# Patient Record
Sex: FEMALE | Race: WHITE | NOT HISPANIC OR LATINO | Employment: PART TIME | URBAN - NONMETROPOLITAN AREA
[De-identification: names, ages, dates, MRNs, and addresses within clinical notes are randomized per-mention and may not be internally consistent; named-entity substitution may affect disease eponyms.]

---

## 2017-02-22 ENCOUNTER — OFFICE VISIT (OUTPATIENT)
Dept: GASTROENTEROLOGY | Facility: CLINIC | Age: 27
End: 2017-02-22

## 2017-02-22 VITALS
BODY MASS INDEX: 31.32 KG/M2 | WEIGHT: 170.2 LBS | SYSTOLIC BLOOD PRESSURE: 101 MMHG | HEART RATE: 81 BPM | DIASTOLIC BLOOD PRESSURE: 70 MMHG | HEIGHT: 62 IN

## 2017-02-22 DIAGNOSIS — K21.00 GASTROESOPHAGEAL REFLUX DISEASE WITH ESOPHAGITIS: Primary | ICD-10-CM

## 2017-02-22 DIAGNOSIS — R10.84 GENERALIZED ABDOMINAL PAIN: ICD-10-CM

## 2017-02-22 DIAGNOSIS — K58.0 IRRITABLE BOWEL SYNDROME WITH DIARRHEA: ICD-10-CM

## 2017-02-22 PROCEDURE — 99213 OFFICE O/P EST LOW 20 MIN: CPT | Performed by: PHYSICIAN ASSISTANT

## 2017-02-22 RX ORDER — OMEPRAZOLE 20 MG/1
20 CAPSULE, DELAYED RELEASE ORAL DAILY PRN
COMMUNITY
End: 2017-07-19

## 2017-02-22 RX ORDER — HYOSCYAMINE SULFATE EXTENDED-RELEASE 0.38 MG/1
0.38 TABLET ORAL NIGHTLY PRN
COMMUNITY
End: 2017-07-19

## 2017-07-19 ENCOUNTER — OFFICE VISIT (OUTPATIENT)
Dept: FAMILY MEDICINE CLINIC | Facility: CLINIC | Age: 27
End: 2017-07-19

## 2017-07-19 VITALS
DIASTOLIC BLOOD PRESSURE: 62 MMHG | BODY MASS INDEX: 32.63 KG/M2 | SYSTOLIC BLOOD PRESSURE: 108 MMHG | WEIGHT: 177.31 LBS | HEART RATE: 95 BPM | HEIGHT: 62 IN | OXYGEN SATURATION: 98 %

## 2017-07-19 DIAGNOSIS — G44.209 TENSION HEADACHE: ICD-10-CM

## 2017-07-19 DIAGNOSIS — K58.0 IRRITABLE BOWEL SYNDROME WITH DIARRHEA: ICD-10-CM

## 2017-07-19 DIAGNOSIS — K21.00 GASTRO-ESOPHAGEAL REFLUX DISEASE WITH ESOPHAGITIS: Primary | ICD-10-CM

## 2017-07-19 PROCEDURE — 99214 OFFICE O/P EST MOD 30 MIN: CPT | Performed by: STUDENT IN AN ORGANIZED HEALTH CARE EDUCATION/TRAINING PROGRAM

## 2017-07-19 RX ORDER — OMEPRAZOLE 20 MG/1
20 CAPSULE, DELAYED RELEASE ORAL DAILY PRN
Qty: 30 CAPSULE | Refills: 1 | Status: SHIPPED | OUTPATIENT
Start: 2017-07-19 | End: 2018-02-21

## 2017-07-19 RX ORDER — CYCLOBENZAPRINE HCL 10 MG
10 TABLET ORAL 3 TIMES DAILY PRN
Qty: 45 TABLET | Refills: 0 | Status: SHIPPED | OUTPATIENT
Start: 2017-07-19 | End: 2017-08-17

## 2017-07-19 RX ORDER — HYOSCYAMINE SULFATE EXTENDED-RELEASE 0.38 MG/1
0.38 TABLET ORAL NIGHTLY PRN
Qty: 30 TABLET | Refills: 1 | Status: SHIPPED | OUTPATIENT
Start: 2017-07-19 | End: 2017-08-17

## 2017-07-19 RX ORDER — IBUPROFEN 600 MG/1
600 TABLET ORAL EVERY 6 HOURS PRN
Qty: 30 TABLET | Refills: 1 | Status: SHIPPED | OUTPATIENT
Start: 2017-07-19 | End: 2017-08-17

## 2017-07-19 NOTE — PROGRESS NOTES
HISTORY AND PHYSICAL  NAME: Renetta Cote  : 1990  MRN: [id number]    DATE OF ADMISSION: 17    DATE & TIME SEEN: 17 3:43 PM    PCP: William Thurman MD    CODE STATUS: [unfilled]    CHIEF COMPLAINT:   headaches      HPI:  Renetta Cote is a 27 y.o. female who presents with  p/t presents to the clinic with headaches which she has had for last 2 years. Patient states the headaches occur any time in the day with no specific triggers. P/t would rate the pain from the headache a 10/10 . She states the headaches have been almost daily and would occur all day. P/ts arms, hands, fingers would go numb during some headaches. Headaches feel like a band around her head. P/cricket was taking excedrin but would only work sometimes.  P/t states the headaches cause her to not focus. P/t also feels nausea during headaches.  P/t states she does not know what may have caused this. Heraclio also would like to be restarted on her IBS medication, Hyoscyamine and acid reflux Omeprazole. Heraclio has PSH of a cholesystectomy in .         CONCURRENT MEDICAL HISTORY:  Past Medical History:   Diagnosis Date   • Anxiety    • Diarrhea    • Dysmenorrhea    • Dysphagia    • Encounter for gynecological examination without abnormal finding    • Encounter for initial prescription of contraceptive pills    • Encounter for screening for infections with predominantly sexual mode of transmission    • Endometriosis    • Excessive and frequent menstruation with regular cycle    • Generalized abdominal pain    • GERD (gastroesophageal reflux disease)    • Hypertension    • IBS (irritable bowel syndrome)    • Secondary dysmenorrhea    • Stricture of esophagus    • Vitamin D deficiency     Deficiency of vitamin D2 - cont supp, may take 5000U daily          PAST SURGICAL HISTORY:  Past Surgical History:   Procedure Laterality Date   • CHOLECYSTECTOMY      Laparoscopic cholecystectomy with operative cholangiogram. Right upper  quadrant pain. 10/12/2006       • COLONOSCOPY  10/17/2011   • ENDOSCOPY      Colitis in terminal ileum. Biopsy taken. 10/17/2011       • ENDOSCOPY      Esophageal stricture was present. Dilatation was performed. Esophagitis seen. Biopsy taken. Gastritis was found in the stomach. Biopsy taken. Normal duodenum. 01/12/2015      • ENDOSCOPY      The stomach appeared mike. A single biopsy was obtained and placed on PyloriTek strip. Normal EGD (45.13) 01/05/2007       • EYE SURGERY      Bilateral medial rectus recession. Entropion. 10/31/1994       • OTHER SURGICAL HISTORY      TUBE IMPLANT GENERAL ANESTHETIC 24078 (1)    Bilateral myringototomy with tube insertions and adenoidectomy. 04/18/2001       • UPPER GASTROINTESTINAL ENDOSCOPY  01/12/2015       FAMILY HISTORY:  Family History   Problem Relation Age of Onset   • Diabetes Maternal Aunt    • Hypertension Maternal Aunt    • Heart disease Maternal Grandmother    • Hypertension Maternal Grandmother         SOCIAL HISTORY:  Social History     Social History   • Marital status:      Spouse name: N/A   • Number of children: N/A   • Years of education: N/A     Occupational History   • Not on file.     Social History Main Topics   • Smoking status: Current Some Day Smoker     Packs/day: 0.25     Years: 1.00     Types: Cigarettes     Start date: 7/19/2016   • Smokeless tobacco: Never Used   • Alcohol use No   • Drug use: No   • Sexual activity: Not on file     Other Topics Concern   • Not on file     Social History Narrative   • No narrative on file       HOME MEDICATIONS:    Current Outpatient Prescriptions:   •  cyclobenzaprine (FLEXERIL) 10 MG tablet, Take 1 tablet by mouth 3 (Three) Times a Day As Needed for Muscle Spasms., Disp: 45 tablet, Rfl: 0  •  hyoscyamine (LEVBID) 0.375 MG 12 hr tablet, Take 1 tablet by mouth At Night As Needed for Cramping., Disp: 30 tablet, Rfl: 1  •  ibuprofen (ADVIL,MOTRIN) 600 MG tablet, Take 1 tablet by mouth Every 6 (Six) Hours As  Needed for Mild Pain ., Disp: 30 tablet, Rfl: 1  •  omeprazole (priLOSEC) 20 MG capsule, Take 1 capsule by mouth Daily As Needed (take as needed)., Disp: 30 capsule, Rfl: 1    ALLERGIES:  Review of patient's allergies indicates no known allergies.    REVIEW OF SYSTEMS  Review of Systems   Constitutional: Positive for appetite change and fatigue.        Dec. Appetite d/t headaches   HENT: Positive for hearing loss. Negative for ear pain, facial swelling and tinnitus.         Hearing loss during headaches   Eyes: Negative for photophobia and visual disturbance.   Respiratory: Negative.    Cardiovascular: Negative.    Gastrointestinal: Positive for abdominal pain, diarrhea and nausea. Negative for abdominal distention and vomiting.        All around pain 3-4 x week   Musculoskeletal: Positive for neck stiffness.        Neck pain once in a while during headaches   Neurological: Positive for dizziness, weakness, numbness and headaches.   Psychiatric/Behavioral: Positive for agitation. Negative for confusion.       PHYSICAL EXAM:  Heart Rate:  [95] 95  BP: (108)/(62) 108/62  Body mass index is 32.43 kg/(m^2).  Physical Exam   Constitutional: She is oriented to person, place, and time. She appears well-developed and well-nourished.   HENT:   Head: Normocephalic and atraumatic.   Right Ear: External ear normal.   Left Ear: External ear normal.   Eyes: Conjunctivae and EOM are normal. Pupils are equal, round, and reactive to light.   Neck: Normal range of motion.   Cardiovascular: Normal rate, regular rhythm and normal heart sounds.    Pulmonary/Chest: Effort normal and breath sounds normal.   Abdominal: Soft. Bowel sounds are normal.   Musculoskeletal: Normal range of motion.   Neurological: She is alert and oriented to person, place, and time.   Skin: Skin is warm and dry.   Psychiatric: She has a normal mood and affect. Her behavior is normal. Judgment and thought content normal.           None    ASSESSMENT AND PLAN:  This is a 27 y.o. female with:  1. Tension Headache: p/t will be started on 600 motrin and Flexeril 10mg TID. Will f/u with p/t in 1 month.   2. IBS w/ diarrhea: p/t was not taking prescribed medication due to moving issues, will be re started on hyoscyamine. P/t currently sees Dr. Page.   3. Gerd w/ esophagitis: p/t will be re started on omeprazole 20mg.        Risk Score: 3      I discussed the patients findings and my recommendations with patient.     Dr. Shipman  is the attending on record at time of admission, he is aware of the patient's status and agrees with the above history and physical.    Goal:   Health maint: was discussed with patient, and she is up to date on her pap smear. Also discussed smoking cessation as the p/t just started 1 year back. And talked about the effects of smoking on her headaches.

## 2017-07-21 NOTE — PROGRESS NOTES
I have reviewed the notes, assessments, and/or procedures performed. I concur with her/his documentation of Renetta Cote.     Yang Shipman, DO

## 2017-08-03 RX ORDER — CYCLOBENZAPRINE HCL 10 MG
TABLET ORAL
Qty: 45 TABLET | Refills: 0 | OUTPATIENT
Start: 2017-08-03

## 2017-08-17 ENCOUNTER — OFFICE VISIT (OUTPATIENT)
Dept: FAMILY MEDICINE CLINIC | Facility: CLINIC | Age: 27
End: 2017-08-17

## 2017-08-17 VITALS
DIASTOLIC BLOOD PRESSURE: 78 MMHG | WEIGHT: 180.19 LBS | HEART RATE: 67 BPM | SYSTOLIC BLOOD PRESSURE: 118 MMHG | OXYGEN SATURATION: 99 % | BODY MASS INDEX: 33.16 KG/M2 | HEIGHT: 62 IN

## 2017-08-17 DIAGNOSIS — G44.209 TENSION HEADACHE: Primary | ICD-10-CM

## 2017-08-17 DIAGNOSIS — K52.9 IBD (INFLAMMATORY BOWEL DISEASE): ICD-10-CM

## 2017-08-17 DIAGNOSIS — F41.9 ANXIETY: ICD-10-CM

## 2017-08-17 PROCEDURE — 99213 OFFICE O/P EST LOW 20 MIN: CPT | Performed by: STUDENT IN AN ORGANIZED HEALTH CARE EDUCATION/TRAINING PROGRAM

## 2017-08-17 PROCEDURE — 99406 BEHAV CHNG SMOKING 3-10 MIN: CPT | Performed by: STUDENT IN AN ORGANIZED HEALTH CARE EDUCATION/TRAINING PROGRAM

## 2017-08-17 RX ORDER — AMITRIPTYLINE HYDROCHLORIDE 25 MG/1
25 TABLET, FILM COATED ORAL NIGHTLY
Qty: 30 TABLET | Refills: 0 | Status: SHIPPED | OUTPATIENT
Start: 2017-08-17 | End: 2017-09-12 | Stop reason: SDUPTHER

## 2017-08-17 RX ORDER — NAPROXEN 375 MG/1
375 TABLET ORAL ONCE AS NEEDED
Qty: 30 TABLET | Refills: 1 | Status: SHIPPED | OUTPATIENT
Start: 2017-08-17 | End: 2017-09-12 | Stop reason: SDUPTHER

## 2017-08-17 NOTE — PROGRESS NOTES
ID: Renetta Cote    CC:   Chief Complaint   Patient presents with   • Headache   • Follow-up       Subjective:     HPI     Renetta Cote is a 27 y.o. female who presents for follow up for tension headaches. P/t was given Flexeril and motrin 600 last month. She has been taking the ibuprofen only when she gets the headaches but has not been feeling better. Flexiril she was taking only one time a day and did feel drowsy so she stopped taking them. P/t has some anxiety. She doesn't feel like going out as much as she used to. Trys to avoid contact with others. Would like to see therapist to get properly diagnosed.   Appointment with Dr. Camilo Hillman is next wed. P/t will have her Levbid med changed to something covered by insurance.     Preventative:  Over the past 2 weeks, have you felt down, depressed, or hopeless?would rather stay home and not go out to deal with people   Over the past 2 weeks, have you felt little interest or pleasure in doing things?No  Clinical depression screening refused by patient.No         Past Medical Hx:  Past Medical History:   Diagnosis Date   • Anxiety    • Diarrhea    • Dysmenorrhea    • Dysphagia    • Encounter for gynecological examination without abnormal finding    • Encounter for initial prescription of contraceptive pills    • Encounter for screening for infections with predominantly sexual mode of transmission    • Endometriosis    • Excessive and frequent menstruation with regular cycle    • Generalized abdominal pain    • GERD (gastroesophageal reflux disease)    • Hypertension    • IBS (irritable bowel syndrome)    • Secondary dysmenorrhea    • Stricture of esophagus    • Vitamin D deficiency     Deficiency of vitamin D2 - cont supp, may take 5000U daily          Past Surgical Hx:  Past Surgical History:   Procedure Laterality Date   • CHOLECYSTECTOMY      Laparoscopic cholecystectomy with operative cholangiogram. Right upper quadrant pain. 10/12/2006       •  COLONOSCOPY  10/17/2011   • ENDOSCOPY      Colitis in terminal ileum. Biopsy taken. 10/17/2011       • ENDOSCOPY      Esophageal stricture was present. Dilatation was performed. Esophagitis seen. Biopsy taken. Gastritis was found in the stomach. Biopsy taken. Normal duodenum. 01/12/2015      • ENDOSCOPY      The stomach appeared mike. A single biopsy was obtained and placed on PyloriTek strip. Normal EGD (45.13) 01/05/2007       • EYE SURGERY      Bilateral medial rectus recession. Entropion. 10/31/1994       • OTHER SURGICAL HISTORY      TUBE IMPLANT GENERAL ANESTHETIC 27041 (1)    Bilateral myringototomy with tube insertions and adenoidectomy. 04/18/2001       • UPPER GASTROINTESTINAL ENDOSCOPY  01/12/2015       Health Maintenance:  Health Maintenance   Topic Date Due   • PNEUMOCOCCAL VACCINE (19-64 MEDIUM RISK) (1 of 1 - PPSV23) 07/13/2008   • TDAP/TD VACCINES (1 - Tdap) 07/13/2008   • INFLUENZA VACCINE  09/01/2017   • PAP SMEAR  10/26/2019       Current Meds:    Current Outpatient Prescriptions:   •  amitriptyline (ELAVIL) 25 MG tablet, Take 1 tablet by mouth Every Night., Disp: 30 tablet, Rfl: 0  •  naproxen (NAPROSYN) 375 MG tablet, Take 1 tablet by mouth 1 (One) Time As Needed for Mild Pain  for up to 1 dose., Disp: 30 tablet, Rfl: 1  •  omeprazole (priLOSEC) 20 MG capsule, Take 1 capsule by mouth Daily As Needed (take as needed)., Disp: 30 capsule, Rfl: 1    Allergies:  Review of patient's allergies indicates no known allergies.    Family Hx:  Family History   Problem Relation Age of Onset   • Diabetes Maternal Aunt    • Hypertension Maternal Aunt    • Heart disease Maternal Grandmother    • Hypertension Maternal Grandmother         Social History:  Social History     Social History   • Marital status:      Spouse name: N/A   • Number of children: N/A   • Years of education: N/A     Occupational History   • Not on file.     Social History Main Topics   • Smoking status: Current Some Day Smoker      "Packs/day: 0.25     Years: 1.00     Types: Cigarettes     Start date: 7/19/2016   • Smokeless tobacco: Never Used   • Alcohol use No   • Drug use: No   • Sexual activity: Not on file     Other Topics Concern   • Not on file     Social History Narrative   • No narrative on file       Review of Systems   Constitutional: Negative for activity change, appetite change and fatigue.   HENT: Negative for ear pain, hearing loss and tinnitus.    Eyes: Negative for photophobia and visual disturbance.   Respiratory: Negative for cough, choking, chest tightness and shortness of breath.    Cardiovascular: Negative for chest pain and leg swelling.   Gastrointestinal: Positive for nausea. Negative for abdominal distention, abdominal pain, blood in stool, constipation, diarrhea, rectal pain and vomiting.   Musculoskeletal: Negative for neck pain and neck stiffness.   Neurological: Positive for headaches. Negative for dizziness, tremors, weakness, light-headedness and numbness.   Psychiatric/Behavioral: Positive for agitation. Negative for decreased concentration, sleep disturbance and suicidal ideas. The patient is nervous/anxious.            Objective:     /78  Pulse 67  Ht 62\" (157.5 cm)  Wt 180 lb 3 oz (81.7 kg)  LMP 07/01/2017  SpO2 99%  BMI 32.96 kg/m2    Physical Exam   Constitutional: She is oriented to person, place, and time. She appears well-developed and well-nourished.   HENT:   Right Ear: External ear normal.   Left Ear: External ear normal.   Eyes: Conjunctivae and EOM are normal. Pupils are equal, round, and reactive to light.   Neck: Normal range of motion. Neck supple. No thyromegaly present.   Cardiovascular: Normal rate, regular rhythm and normal heart sounds.  Exam reveals no gallop and no friction rub.    No murmur heard.  Pulmonary/Chest: Effort normal and breath sounds normal. No respiratory distress. She has no wheezes.   Neurological: She is alert and oriented to person, place, and time. She has " normal reflexes.   Psychiatric: She has a normal mood and affect. Her behavior is normal. Judgment and thought content normal.              Assessment/Plan:     Renetta was seen today for headache and follow-up.    Diagnoses and all orders for this visit:    Tension headache- Flexiril and Ibuprofen were d/c 2/2 drowsiness and lack of effect.  P/t has hx of gastritis. Switched to naproxen 375mg and amitryp. 25mg once at night. Will f/u in 1 month.     Anxiety  -     Ambulatory Referral to Psychology    Other orders  -     amitriptyline (ELAVIL) 25 MG tablet; Take 1 tablet by mouth Every Night.  -     naproxen (NAPROSYN) 375 MG tablet; Take 1 tablet by mouth 1 (One) Time As Needed for Mild Pain  for up to 1 dose.  IBD  - appointment with dr. Page next wed. For f/u visit.   - no current sx    Follow-up:     Return in about 1 month (around 9/17/2017), or if symptoms worsen or fail to improve.          Health Maintenance   Topic Date Due   • PNEUMOCOCCAL VACCINE (19-64 MEDIUM RISK) (1 of 1 - PPSV23) 07/13/2008   • TDAP/TD VACCINES (1 - Tdap) 07/13/2008   • INFLUENZA VACCINE  09/01/2017   • PAP SMEAR  10/26/2019       Smoking cessation counseling was provided.  occasional/rare  eat more fruits and vegetables, increase water intake and increase physical activity    RISK SCORE: 4          This document has been electronically signed by William Thurman MD on August 17, 2017 9:44 AM

## 2017-09-12 ENCOUNTER — OFFICE VISIT (OUTPATIENT)
Dept: FAMILY MEDICINE CLINIC | Facility: CLINIC | Age: 27
End: 2017-09-12

## 2017-09-12 VITALS
WEIGHT: 184.5 LBS | BODY MASS INDEX: 33.95 KG/M2 | HEIGHT: 62 IN | DIASTOLIC BLOOD PRESSURE: 78 MMHG | OXYGEN SATURATION: 99 % | HEART RATE: 79 BPM | SYSTOLIC BLOOD PRESSURE: 112 MMHG

## 2017-09-12 DIAGNOSIS — G44.229 CHRONIC TENSION-TYPE HEADACHE, NOT INTRACTABLE: Primary | ICD-10-CM

## 2017-09-12 PROCEDURE — 99213 OFFICE O/P EST LOW 20 MIN: CPT | Performed by: STUDENT IN AN ORGANIZED HEALTH CARE EDUCATION/TRAINING PROGRAM

## 2017-09-12 RX ORDER — NAPROXEN 375 MG/1
375 TABLET ORAL ONCE AS NEEDED
Qty: 30 TABLET | Refills: 1 | Status: SHIPPED | OUTPATIENT
Start: 2017-09-12 | End: 2017-11-24 | Stop reason: SDUPTHER

## 2017-09-12 RX ORDER — AMITRIPTYLINE HYDROCHLORIDE 25 MG/1
25 TABLET, FILM COATED ORAL NIGHTLY
Qty: 30 TABLET | Refills: 2 | Status: SHIPPED | OUTPATIENT
Start: 2017-09-12 | End: 2017-12-09 | Stop reason: SDUPTHER

## 2017-09-12 NOTE — PROGRESS NOTES
ID: Renetta Cote    CC:   Chief Complaint   Patient presents with   • Headache     f/u       Subjective:     HPI     Renetta Cote is a 27 y.o. female who presents for follow up for tension headaches. Medication was changed on last visit from motrin and flexeril  to elavil 25 and naprosyn 375 for her daily headaches. Patient has been doing a lot better. States she has had only 3 headaches since her last visit. Before treatment she would have headaches occurring daily. Denies any n/v or fever. Patient states she has completely stopped smoking about 2-3 weeks ago.   Patient states she is struggling losing weight. She does not exercise and does not eat more than 1 meal a day. She states she does drink a lot of soda. I informed patient her first step would be to cut the soda from her diet.   Would like refills on medications today.     Preventative:  Over the past 2 weeks, have you felt down, depressed, or hopeless?No   Over the past 2 weeks, have you felt little interest or pleasure in doing things?No  Clinical depression screening refused by patient.No     On osteoporosis therapy?No     Past Medical Hx:  Past Medical History:   Diagnosis Date   • Anxiety    • Diarrhea    • Dysmenorrhea    • Dysphagia    • Encounter for gynecological examination without abnormal finding    • Encounter for initial prescription of contraceptive pills    • Encounter for screening for infections with predominantly sexual mode of transmission    • Endometriosis    • Excessive and frequent menstruation with regular cycle    • Generalized abdominal pain    • GERD (gastroesophageal reflux disease)    • Hypertension    • IBS (irritable bowel syndrome)    • Secondary dysmenorrhea    • Stricture of esophagus    • Vitamin D deficiency     Deficiency of vitamin D2 - cont supp, may take 5000U daily          Past Surgical Hx:  Past Surgical History:   Procedure Laterality Date   • CHOLECYSTECTOMY      Laparoscopic cholecystectomy  with operative cholangiogram. Right upper quadrant pain. 10/12/2006       • COLONOSCOPY  10/17/2011   • ENDOSCOPY      Colitis in terminal ileum. Biopsy taken. 10/17/2011       • ENDOSCOPY      Esophageal stricture was present. Dilatation was performed. Esophagitis seen. Biopsy taken. Gastritis was found in the stomach. Biopsy taken. Normal duodenum. 01/12/2015      • ENDOSCOPY      The stomach appeared mike. A single biopsy was obtained and placed on PyloriTek strip. Normal EGD (45.13) 01/05/2007       • EYE SURGERY      Bilateral medial rectus recession. Entropion. 10/31/1994       • OTHER SURGICAL HISTORY      TUBE IMPLANT GENERAL ANESTHETIC 93901 (1)    Bilateral myringototomy with tube insertions and adenoidectomy. 04/18/2001       • UPPER GASTROINTESTINAL ENDOSCOPY  01/12/2015       Health Maintenance:  Health Maintenance   Topic Date Due   • PNEUMOCOCCAL VACCINE (19-64 MEDIUM RISK) (1 of 1 - PPSV23) 07/13/2008   • TDAP/TD VACCINES (1 - Tdap) 07/13/2008   • INFLUENZA VACCINE  08/01/2017   • PAP SMEAR  10/26/2019       Current Meds:    Current Outpatient Prescriptions:   •  amitriptyline (ELAVIL) 25 MG tablet, Take 1 tablet by mouth Every Night., Disp: 30 tablet, Rfl: 2  •  naproxen (NAPROSYN) 375 MG tablet, Take 1 tablet by mouth 1 (One) Time As Needed for Mild Pain  for up to 1 dose., Disp: 30 tablet, Rfl: 1  •  omeprazole (priLOSEC) 20 MG capsule, Take 1 capsule by mouth Daily As Needed (take as needed)., Disp: 30 capsule, Rfl: 1    Allergies:  Review of patient's allergies indicates no known allergies.    Family Hx:  Family History   Problem Relation Age of Onset   • Diabetes Maternal Aunt    • Hypertension Maternal Aunt    • Heart disease Maternal Grandmother    • Hypertension Maternal Grandmother         Social History:  Social History     Social History   • Marital status:      Spouse name: N/A   • Number of children: N/A   • Years of education: N/A     Occupational History   • Not on file.  "    Social History Main Topics   • Smoking status: Current Some Day Smoker     Packs/day: 0.25     Years: 1.00     Types: Cigarettes     Start date: 7/19/2016   • Smokeless tobacco: Never Used   • Alcohol use No   • Drug use: No   • Sexual activity: Not on file     Other Topics Concern   • Not on file     Social History Narrative   • No narrative on file       Review of Systems   Constitutional: Negative for activity change, fatigue, fever and unexpected weight change.   Eyes: Negative.    Respiratory: Negative for cough, shortness of breath and wheezing.    Cardiovascular: Negative for chest pain.   Gastrointestinal: Negative for abdominal distention, constipation, diarrhea and nausea.   Endocrine: Negative.    Genitourinary: Negative.    Musculoskeletal: Negative.    Skin: Negative.    Allergic/Immunologic: Negative.    Neurological: Positive for headaches. Negative for weakness.   Hematological: Negative.    Psychiatric/Behavioral: Negative for agitation, behavioral problems, confusion, decreased concentration, dysphoric mood and sleep disturbance. The patient is not nervous/anxious.            Objective:     /78  Pulse 79  Ht 62\" (157.5 cm)  Wt 184 lb 8 oz (83.7 kg)  SpO2 99%  BMI 33.75 kg/m2    Physical Exam   Constitutional: She is oriented to person, place, and time. She appears well-developed and well-nourished.   HENT:   Head: Normocephalic.   Neck: Normal range of motion. No thyromegaly present.   Cardiovascular: Normal rate, regular rhythm and normal heart sounds.  Exam reveals no gallop and no friction rub.    No murmur heard.  Pulmonary/Chest: Effort normal and breath sounds normal. She has no wheezes.   Abdominal: Soft. Bowel sounds are normal.   Neurological: She is alert and oriented to person, place, and time.   Psychiatric: She has a normal mood and affect. Her behavior is normal. Judgment and thought content normal.              Assessment/Plan:     Renetta was seen today for " headache.    Diagnoses and all orders for this visit:    Chronic tension-type headache, not intractable  Patient doing a lot better since being started on medication. Will refill today.   -     amitriptyline (ELAVIL) 25 MG tablet; Take 1 tablet by mouth Every Night.  -     naproxen (NAPROSYN) 375 MG tablet; Take 1 tablet by mouth 1 (One) Time As Needed for Mild Pain  for up to 1 dose.    BMI 33.0-33.9,adult  - patient had some concerns regarding her struggle with losing weight.  - informed patient first step to take right now would be to cut the pop out of her diet.           Follow-up:     Return in about 3 months (around 12/12/2017) for Next scheduled follow up.          Health Maintenance   Topic Date Due   • PNEUMOCOCCAL VACCINE (19-64 MEDIUM RISK) (1 of 1 - PPSV23) 07/13/2008   • TDAP/TD VACCINES (1 - Tdap) 07/13/2008   • INFLUENZA VACCINE  08/01/2017   • PAP SMEAR  10/26/2019       patient states she has quit smoking 2-3 weeks ago.   does not drink  decrease soda or juice intake, increase water intake and increase physical activity    RISK SCORE: 4  This document has been electronically signed by William Thurman MD on September 12, 2017 9:29 AM

## 2017-09-13 NOTE — PROGRESS NOTES
I have seen this patient and discussed the case with resident and agree with the assessment and plan.  RAMYA Cotton M.D.

## 2017-10-16 ENCOUNTER — OFFICE VISIT (OUTPATIENT)
Dept: GASTROENTEROLOGY | Facility: CLINIC | Age: 27
End: 2017-10-16

## 2017-10-16 ENCOUNTER — APPOINTMENT (OUTPATIENT)
Dept: LAB | Facility: HOSPITAL | Age: 27
End: 2017-10-16

## 2017-10-16 VITALS
BODY MASS INDEX: 34.6 KG/M2 | HEART RATE: 82 BPM | SYSTOLIC BLOOD PRESSURE: 122 MMHG | WEIGHT: 188 LBS | DIASTOLIC BLOOD PRESSURE: 82 MMHG | HEIGHT: 62 IN

## 2017-10-16 DIAGNOSIS — K21.00 GASTROESOPHAGEAL REFLUX DISEASE WITH ESOPHAGITIS: Primary | ICD-10-CM

## 2017-10-16 DIAGNOSIS — R10.84 GENERALIZED ABDOMINAL PAIN: ICD-10-CM

## 2017-10-16 DIAGNOSIS — R53.81 MALAISE AND FATIGUE: ICD-10-CM

## 2017-10-16 DIAGNOSIS — R53.83 MALAISE AND FATIGUE: ICD-10-CM

## 2017-10-16 DIAGNOSIS — K58.0 IRRITABLE BOWEL SYNDROME WITH DIARRHEA: ICD-10-CM

## 2017-10-16 DIAGNOSIS — R63.5 WEIGHT GAIN, ABNORMAL: ICD-10-CM

## 2017-10-16 LAB
25(OH)D3 SERPL-MCNC: 25 NG/ML (ref 30–100)
ALBUMIN SERPL-MCNC: 4.5 G/DL (ref 3.4–4.8)
ALBUMIN/GLOB SERPL: 1.3 G/DL (ref 1.1–1.8)
ALP SERPL-CCNC: 72 U/L (ref 38–126)
ALT SERPL W P-5'-P-CCNC: 33 U/L (ref 9–52)
ANION GAP SERPL CALCULATED.3IONS-SCNC: 11 MMOL/L (ref 5–15)
AST SERPL-CCNC: 29 U/L (ref 14–36)
B-HCG UR QL: NEGATIVE
BASOPHILS # BLD AUTO: 0.03 10*3/MM3 (ref 0–0.2)
BASOPHILS NFR BLD AUTO: 0.3 % (ref 0–2)
BILIRUB SERPL-MCNC: 0.6 MG/DL (ref 0.2–1.3)
BUN BLD-MCNC: 11 MG/DL (ref 7–21)
BUN/CREAT SERPL: 16.2 (ref 7–25)
CALCIUM SPEC-SCNC: 8.9 MG/DL (ref 8.4–10.2)
CHLORIDE SERPL-SCNC: 104 MMOL/L (ref 95–110)
CO2 SERPL-SCNC: 28 MMOL/L (ref 22–31)
CREAT BLD-MCNC: 0.68 MG/DL (ref 0.5–1)
DEPRECATED RDW RBC AUTO: 41 FL (ref 36.4–46.3)
EOSINOPHIL # BLD AUTO: 0.44 10*3/MM3 (ref 0–0.7)
EOSINOPHIL NFR BLD AUTO: 4.9 % (ref 0–7)
ERYTHROCYTE [DISTWIDTH] IN BLOOD BY AUTOMATED COUNT: 12.9 % (ref 11.5–14.5)
GFR SERPL CREATININE-BSD FRML MDRD: 103 ML/MIN/1.73 (ref 71–165)
GLOBULIN UR ELPH-MCNC: 3.5 GM/DL (ref 2.3–3.5)
GLUCOSE BLD-MCNC: 93 MG/DL (ref 60–100)
HCT VFR BLD AUTO: 36.5 % (ref 35–45)
HGB BLD-MCNC: 12.5 G/DL (ref 12–15.5)
IMM GRANULOCYTES # BLD: 0.02 10*3/MM3 (ref 0–0.02)
IMM GRANULOCYTES NFR BLD: 0.2 % (ref 0–0.5)
LYMPHOCYTES # BLD AUTO: 1.81 10*3/MM3 (ref 0.6–4.2)
LYMPHOCYTES NFR BLD AUTO: 20 % (ref 10–50)
MCH RBC QN AUTO: 29.7 PG (ref 26.5–34)
MCHC RBC AUTO-ENTMCNC: 34.2 G/DL (ref 31.4–36)
MCV RBC AUTO: 86.7 FL (ref 80–98)
MONOCYTES # BLD AUTO: 0.53 10*3/MM3 (ref 0–0.9)
MONOCYTES NFR BLD AUTO: 5.9 % (ref 0–12)
NEUTROPHILS # BLD AUTO: 6.2 10*3/MM3 (ref 2–8.6)
NEUTROPHILS NFR BLD AUTO: 68.7 % (ref 37–80)
PLATELET # BLD AUTO: 256 10*3/MM3 (ref 150–450)
PMV BLD AUTO: 10.1 FL (ref 8–12)
POTASSIUM BLD-SCNC: 3.7 MMOL/L (ref 3.5–5.1)
PROT SERPL-MCNC: 8 G/DL (ref 6.3–8.6)
RBC # BLD AUTO: 4.21 10*6/MM3 (ref 3.77–5.16)
SODIUM BLD-SCNC: 143 MMOL/L (ref 137–145)
T4 FREE SERPL-MCNC: 0.8 NG/DL (ref 0.78–2.19)
TSH SERPL DL<=0.05 MIU/L-ACNC: 2.63 MIU/ML (ref 0.46–4.68)
VIT B12 BLD-MCNC: 262 PG/ML (ref 239–931)
WBC NRBC COR # BLD: 9.03 10*3/MM3 (ref 3.2–9.8)

## 2017-10-16 PROCEDURE — 82607 VITAMIN B-12: CPT | Performed by: PHYSICIAN ASSISTANT

## 2017-10-16 PROCEDURE — 81025 URINE PREGNANCY TEST: CPT | Performed by: PHYSICIAN ASSISTANT

## 2017-10-16 PROCEDURE — 99213 OFFICE O/P EST LOW 20 MIN: CPT | Performed by: PHYSICIAN ASSISTANT

## 2017-10-16 PROCEDURE — 80050 GENERAL HEALTH PANEL: CPT | Performed by: PHYSICIAN ASSISTANT

## 2017-10-16 PROCEDURE — 84703 CHORIONIC GONADOTROPIN ASSAY: CPT | Performed by: PHYSICIAN ASSISTANT

## 2017-10-16 PROCEDURE — 82306 VITAMIN D 25 HYDROXY: CPT | Performed by: PHYSICIAN ASSISTANT

## 2017-10-16 PROCEDURE — 84439 ASSAY OF FREE THYROXINE: CPT | Performed by: PHYSICIAN ASSISTANT

## 2017-10-16 PROCEDURE — 36415 COLL VENOUS BLD VENIPUNCTURE: CPT | Performed by: PHYSICIAN ASSISTANT

## 2017-10-16 PROCEDURE — 84481 FREE ASSAY (FT-3): CPT | Performed by: PHYSICIAN ASSISTANT

## 2017-10-17 DIAGNOSIS — R10.84 ABDOMINAL PAIN, GENERALIZED: ICD-10-CM

## 2017-10-17 DIAGNOSIS — R63.5 WEIGHT GAIN: Primary | ICD-10-CM

## 2017-10-17 LAB
HCG SERPL QL: NEGATIVE
T3FREE SERPL-MCNC: 3.5 PG/ML (ref 2–4.4)

## 2017-10-18 DIAGNOSIS — E55.9 VITAMIN D DEFICIENCY: Primary | ICD-10-CM

## 2017-10-18 RX ORDER — ERGOCALCIFEROL 1.25 MG/1
50000 CAPSULE ORAL
Qty: 4 CAPSULE | Refills: 2 | Status: SHIPPED | OUTPATIENT
Start: 2017-10-18 | End: 2018-02-21

## 2017-11-13 RX ORDER — NAPROXEN 375 MG/1
TABLET ORAL
Qty: 30 TABLET | Refills: 1 | OUTPATIENT
Start: 2017-11-13

## 2017-11-27 ENCOUNTER — OFFICE VISIT (OUTPATIENT)
Dept: BEHAVIORAL HEALTH | Facility: CLINIC | Age: 27
End: 2017-11-27

## 2017-11-27 DIAGNOSIS — F41.1 GENERALIZED ANXIETY DISORDER: Primary | ICD-10-CM

## 2017-11-27 PROCEDURE — 90791 PSYCH DIAGNOSTIC EVALUATION: CPT | Performed by: PSYCHOLOGIST

## 2017-11-27 RX ORDER — NAPROXEN 375 MG/1
TABLET ORAL
Qty: 30 TABLET | Refills: 1 | Status: SHIPPED | OUTPATIENT
Start: 2017-11-27 | End: 2018-02-21

## 2017-11-27 NOTE — PROGRESS NOTES
11/27/2017    Renetta Cote, a 27 y.o. female, was seen today for initial appointment lasting 45 minutes.  Patient is referred by William Thurman MD .     SUBJECTIVE:  Patient reported that she suffers from anxiety.  She has panic attacks where her heart races and she has a scared feeling.  They occur once every couple of weeks.  Current treatment is Elavil prescribed by her physician.  The main cause of her anxiety, and the main stressor in her life is her mother.  Her mother is always interfering in her life and is always critical of everything the patient does.  This patient lives with her fiancé of 3 years.  She has 1 daughter age 7, and a stepson age 10.  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  Her current patient is her nephew.    FAMILY HISTORY:  Negative for psychiatric conditions    This patient was born and raised here in [facility].  Her parents were  when she was age 1 and her mother remarried her stepfather.  Patient did not suffer any mistreatment or abuse.  She did well in school and graduated high school    MENTAL STATUS:  Patient presents as an attractive young lady who looks her stated age.  She is oriented ×3, thoughts are goal-directed and logical, memory and concentration were well within normal limits, emotions were appropriate.  She has no problems sleeping.  Her energy during the day is fairly good he though she is tired and feels dizzy.  She occasionally has feelings of depression being overwhelmed.  She has anxiety most of the time and occasional panic attacks.  Sometimes she is graham.  She denies suicidal ideation, homicidal ideation, and hallucinations.    DIAGNOSIS:    ICD-10-CM ICD-9-CM   1. Generalized anxiety disorder F41.1 300.02       ASSESSMENT PLAN:  The main issue that seems to be creating her anxiety is dealing with her mother.  Today we talked about the need for Renetta to disengage from arguments with her  mother.  Not allow her mother to get her upset.          This document has been electronically signed by Joshua Davis EdD on November 27, 2017 11:10 AM

## 2017-12-11 RX ORDER — AMITRIPTYLINE HYDROCHLORIDE 25 MG/1
TABLET, FILM COATED ORAL
Qty: 30 TABLET | Refills: 2 | Status: SHIPPED | OUTPATIENT
Start: 2017-12-11 | End: 2018-02-21

## 2018-01-25 ENCOUNTER — OFFICE VISIT (OUTPATIENT)
Dept: BEHAVIORAL HEALTH | Facility: CLINIC | Age: 28
End: 2018-01-25

## 2018-01-25 DIAGNOSIS — F41.1 GENERALIZED ANXIETY DISORDER: Primary | ICD-10-CM

## 2018-01-25 PROCEDURE — 90834 PSYTX W PT 45 MINUTES: CPT | Performed by: PSYCHOLOGIST

## 2018-01-25 NOTE — PROGRESS NOTES
This patient was seen today for 45 minute therapy appointment    Again we talk about her anxiety.  She stated that 80% of her anxiety is caused by her mother.  Her mother was always trying to manipulate, and interfere in patient's life.  This patient is living with her boyfriend, her 7-year-old daughter and his 10-year-old son.  Mother has lots of advice for them.    We talked about the patient's plan for dealing with her mother.  She simply doesn't respond to mother's more provocative statements.  She doesn't allow mother to bully her.  The patient and her  agree on how to deal with mother, and they don't let mother divide them up or create drama.  However, mother's constant meddling does take a toll on the patient    Here today this patient is oriented ×3, thoughts goal-directed and logical, memory and concentration are well within normal limits.  Affect is appropriate    Next appointment will be in one month

## 2018-02-16 ENCOUNTER — TELEPHONE (OUTPATIENT)
Dept: FAMILY MEDICINE CLINIC | Facility: CLINIC | Age: 28
End: 2018-02-16

## 2018-03-05 ENCOUNTER — OFFICE VISIT (OUTPATIENT)
Dept: FAMILY MEDICINE CLINIC | Facility: CLINIC | Age: 28
End: 2018-03-05

## 2018-03-05 VITALS
HEART RATE: 75 BPM | OXYGEN SATURATION: 98 % | HEIGHT: 62 IN | DIASTOLIC BLOOD PRESSURE: 69 MMHG | BODY MASS INDEX: 32.54 KG/M2 | SYSTOLIC BLOOD PRESSURE: 110 MMHG | WEIGHT: 176.8 LBS

## 2018-03-05 DIAGNOSIS — M54.50 ACUTE MIDLINE LOW BACK PAIN WITHOUT SCIATICA: Primary | ICD-10-CM

## 2018-03-05 PROCEDURE — 99213 OFFICE O/P EST LOW 20 MIN: CPT | Performed by: STUDENT IN AN ORGANIZED HEALTH CARE EDUCATION/TRAINING PROGRAM

## 2018-03-05 NOTE — PROGRESS NOTES
ID: Renetta Cote    CC:   Chief Complaint   Patient presents with   • Back Pain     Right low back        Subjective:     HPI     Renetta Cote is a 27 y.o. female who presents for an urgent care follow up after falling on her back, after  slipping on ice in January. Went to  on 2/21 becuase of continued pain in lower back. An Xray completed on the back showed degen changes, most likely not due to acute injury. Patient reports feeling much better with improvement in pain, and some slight tightness. Patient uses NSAIDS occasionally along with heat/ice pack.   Reports she has been working on her diet and will continue to not drink pop. Her next step for weight reduction will be to exercise.    Patient denies any flank pain, hematuria, dysuria, polyuria, polydipsia.   Preventative:  Over the past 2 weeks, have you felt down, depressed, or hopeless?Yes   Over the past 2 weeks, have you felt little interest or pleasure in doing things?Yes  Clinical depression screening refused by patient.Yes     On osteoporosis therapy?No     Past Medical Hx:  Past Medical History:   Diagnosis Date   • Anxiety    • Diarrhea    • Dysmenorrhea    • Dysphagia    • Encounter for gynecological examination without abnormal finding    • Encounter for initial prescription of contraceptive pills    • Encounter for screening for infections with predominantly sexual mode of transmission    • Endometriosis    • Excessive and frequent menstruation with regular cycle    • Generalized abdominal pain    • GERD (gastroesophageal reflux disease)    • Hypertension    • IBS (irritable bowel syndrome)    • Secondary dysmenorrhea    • Stricture of esophagus    • Vitamin D deficiency     Deficiency of vitamin D2 - cont supp, may take 5000U daily          Past Surgical Hx:  Past Surgical History:   Procedure Laterality Date   • CHOLECYSTECTOMY      Laparoscopic cholecystectomy with operative cholangiogram. Right upper quadrant pain.  10/12/2006       • COLONOSCOPY  10/17/2011   • ENDOSCOPY      Colitis in terminal ileum. Biopsy taken. 10/17/2011       • ENDOSCOPY      Esophageal stricture was present. Dilatation was performed. Esophagitis seen. Biopsy taken. Gastritis was found in the stomach. Biopsy taken. Normal duodenum. 01/12/2015      • ENDOSCOPY      The stomach appeared mike. A single biopsy was obtained and placed on PyloriTek strip. Normal EGD (45.13) 01/05/2007       • EYE SURGERY      Bilateral medial rectus recession. Entropion. 10/31/1994       • OTHER SURGICAL HISTORY      TUBE IMPLANT GENERAL ANESTHETIC 79364 (1)    Bilateral myringototomy with tube insertions and adenoidectomy. 04/18/2001       • UPPER GASTROINTESTINAL ENDOSCOPY  01/12/2015       Health Maintenance:  Health Maintenance   Topic Date Due   • TDAP/TD VACCINES (1 - Tdap) 07/13/2008   • INFLUENZA VACCINE  08/01/2017   • PAP SMEAR  10/26/2019       Current Meds:    Current Outpatient Prescriptions:   •  cyclobenzaprine (FLEXERIL) 5 MG tablet, Take one pill qhs prn muscle relaxation, Disp: 21 tablet, Rfl: 0  •  diclofenac (VOLTAREN) 75 MG EC tablet, Take 1 tablet by mouth 2 (Two) Times a Day., Disp: 60 tablet, Rfl: 0    Allergies:  Review of patient's allergies indicates no known allergies.    Family Hx:  Family History   Problem Relation Age of Onset   • Diabetes Maternal Aunt    • Hypertension Maternal Aunt    • Heart disease Maternal Grandmother    • Hypertension Maternal Grandmother         Social History:  Social History     Social History   • Marital status:      Spouse name: N/A   • Number of children: N/A   • Years of education: N/A     Occupational History   • Not on file.     Social History Main Topics   • Smoking status: Former Smoker     Packs/day: 0.25     Years: 1.00     Types: Cigarettes     Start date: 7/19/2016     Quit date: 8/16/2017   • Smokeless tobacco: Never Used   • Alcohol use No   • Drug use: No   • Sexual activity: Not on file  "    Other Topics Concern   • Not on file     Social History Narrative       Review of Systems  General:negative for - chills, fatigue, fever, hot flashes, malaise, night sweats, weight gain or weight loss  Psychological: negative for - anxiety, depression, sleep disturbances or suicidal ideation  Ophthalmic: negative for - blurry vision or loss of vision  ENT: negative for - hearing change, nasal congestion or sore throat  Hematological and Lymphatic: negative for - jaundice  Endocrine: negative for - hair pattern changes, skin changes or temperature intolerance  Respiratory: no cough, shortness of breath, or wheezing  Cardiovascular: no chest pain, edema or dyspnea on exertion  Gastrointestinal: no  Nausea/vomiting, abdominal pain, change in bowel habits, or black or bloody stools  Genito-Urinary: no dysuria, trouble voiding, or hematuria  Musculoskeletal: negative for - joint pain or muscle pain  Neurological: negative for - dizziness, headaches, numbness/tingling or seizures  Dermatological: negative for rash and skin lesion changes        Objective:     /69 (BP Location: Right arm, Patient Position: Sitting, Cuff Size: Adult)  Pulse 75  Ht 157.5 cm (62\")  Wt 80.2 kg (176 lb 12.8 oz)  LMP 02/14/2018  SpO2 98%  BMI 32.34 kg/m2    Physical Exam  General Appearance:    Alert, cooperative, no distress, appears stated age   Head:    Normocephalic, without obvious abnormality, atraumatic   Eyes:    PERRL, conjunctiva/corneas clear, EOM's intact   Ears:    Normal TM's and external ear canals, both ears   Nose:   Nares normal, septum midline, mucosa normal, no drainage     or sinus tenderness   Throat:   Lips, mucosa, and tongue normal; teeth and gums normal   Neck:   Supple, symmetrical, trachea midline, no adenopathy   Back:     Symmetric, no curvature, ROM normal   Lungs:     Clear to auscultation bilaterally, respirations unlabored   Chest Wall:    No tenderness or deformity    Heart:    Regular rate and " rhythm, S1 and S2 normal, no murmur, rub    or gallop   Abdomen:     Soft, non-tender, bowel sounds active all four quadrants,     no masses, no organomegaly   Extremities:   Extremities normal, atraumatic, no cyanosis or edema   Pulses:   2+ and symmetric all extremities   Skin:   Skin color, texture, turgor normal, no rashes or lesions   Lymph nodes:   Cervical, supraclavicular nodes normal   Neurologic:   CNII-XII grossly intact              Assessment/Plan:     Renetta was seen today for back pain.    Diagnoses and all orders for this visit:    Acute midline low back pain without sciatica    - improving, continue NSAIDS as needed along with heat/ice, and rest.  - Stretching exercises discussed      Follow-up:     Return in about 6 months (around 9/5/2018).      Goals:exercise  Barriers to goals:adherence    Health Maintenance   Topic Date Due   • TDAP/TD VACCINES (1 - Tdap) 07/13/2008   • INFLUENZA VACCINE  08/01/2017   • PAP SMEAR  10/26/2019       Tobacco: smoking cessation counseling was provided  Alcohol:none  Lifestyle: Body mass index is 32.34 kg/(m^2).   Discontinue pop from diet, continue fruits/veggies, no fried foods, exercise daily    RISK SCORE: 2            This document has been electronically signed by William Thurman MD on March 8, 2018 2:58 AM

## 2018-03-14 ENCOUNTER — TELEPHONE (OUTPATIENT)
Dept: FAMILY MEDICINE CLINIC | Facility: CLINIC | Age: 28
End: 2018-03-14

## 2018-03-21 RX ORDER — AMITRIPTYLINE HYDROCHLORIDE 25 MG/1
25 TABLET, FILM COATED ORAL NIGHTLY PRN
Qty: 30 TABLET | Refills: 2 | Status: SHIPPED | OUTPATIENT
Start: 2018-03-21 | End: 2018-05-25

## 2018-04-13 ENCOUNTER — OFFICE VISIT (OUTPATIENT)
Dept: OBSTETRICS AND GYNECOLOGY | Facility: CLINIC | Age: 28
End: 2018-04-13

## 2018-04-13 ENCOUNTER — TELEPHONE (OUTPATIENT)
Dept: OBSTETRICS AND GYNECOLOGY | Facility: CLINIC | Age: 28
End: 2018-04-13

## 2018-04-13 VITALS
DIASTOLIC BLOOD PRESSURE: 85 MMHG | HEIGHT: 62 IN | WEIGHT: 175 LBS | HEART RATE: 72 BPM | SYSTOLIC BLOOD PRESSURE: 127 MMHG | BODY MASS INDEX: 32.2 KG/M2

## 2018-04-13 DIAGNOSIS — N89.8 VAGINAL DISCHARGE: ICD-10-CM

## 2018-04-13 DIAGNOSIS — N93.9 ABNORMAL UTERINE BLEEDING (AUB): Primary | ICD-10-CM

## 2018-04-13 LAB
CANDIDA ALBICANS: NEGATIVE
GARDNERELLA VAGINALIS: POSITIVE
TRICHOMONAS VAGINALIS PCR: NEGATIVE

## 2018-04-13 PROCEDURE — 87510 GARDNER VAG DNA DIR PROBE: CPT | Performed by: NURSE PRACTITIONER

## 2018-04-13 PROCEDURE — 99214 OFFICE O/P EST MOD 30 MIN: CPT | Performed by: NURSE PRACTITIONER

## 2018-04-13 PROCEDURE — 87660 TRICHOMONAS VAGIN DIR PROBE: CPT | Performed by: NURSE PRACTITIONER

## 2018-04-13 PROCEDURE — 87480 CANDIDA DNA DIR PROBE: CPT | Performed by: NURSE PRACTITIONER

## 2018-04-13 RX ORDER — METRONIDAZOLE 500 MG/1
500 TABLET ORAL 2 TIMES DAILY
Qty: 14 TABLET | Refills: 0 | Status: SHIPPED | OUTPATIENT
Start: 2018-04-13 | End: 2018-04-16

## 2018-04-13 NOTE — PROGRESS NOTES
Subjective   Chief Complaint   Patient presents with   • Menstrual Problem     Renetta Cote is a 27 y.o. year old No obstetric history on file. presenting to be seen with complaints of trouble with her menses.   Current birth control method: none.    Patient's last menstrual period was 03/09/2018 (approximate).    The last time she recalls having predictable regular cycles was 2/19. Started bleeding again on 3/9 and had daily bleeding until 4/9. Most days in the first 2-3 weeks were light spotting or only pink to red d/c when she would wipe. During the first week of April it was heavier like a normal period.      · Additional notable symptoms: none  · Changes in weight: weight has decreased 10 pounds over last 3 month(s)  · Recent increase in stress? no  · Is there associated pain ? no    Past 6 month menstrual history:    Cycle Frequency: regular, predictable and consistent every 28 - 32 days   Menstrual cycle character: flow is typically normal   Cycle Duration: 5 - 7   Number of heavy days of flows: 2   Dysmenorrhea: mild and is not affecting her activities of daily living   PMS: mild and is not affecting her activities of daily living   Intermenstrual bleeding present: {no   Post-coital bleeding present: no     She is sexually active.  In the past 12 months there has not been new sexual partners.  Condoms are not typically used.  She would not like to be screened for STD's at today's exam.     No Additional Complaints Reported    The following portions of the patient's history were reviewed and updated as appropriate:problem list, current medications, allergies, past medical history, past social history and past surgical history    Smoking status: Former Smoker                                                              Packs/day: 0.25      Years: 1.00         Types: Cigarettes     Start date: 7/19/2016     Quit date: 8/16/2017  Smokeless tobacco: Never Used                        [employer]  "  Constitutional: Negative for activity change, appetite change, fatigue and unexpected weight change.   HENT: Negative for congestion and trouble swallowing.    Respiratory: Negative for chest tightness and shortness of breath.    Cardiovascular: Negative for chest pain, palpitations and leg swelling.   Gastrointestinal: Negative for abdominal distention, abdominal pain, blood in stool, constipation, diarrhea, nausea and vomiting.   Endocrine: Negative for cold intolerance, heat intolerance, polydipsia, polyphagia and polyuria.   Genitourinary: Positive for menstrual problem and vaginal discharge. Negative for difficulty urinating, dyspareunia, dysuria, genital sores, pelvic pain, urgency, vaginal bleeding and vaginal pain.   Musculoskeletal: Negative for gait problem and myalgias.   Skin: Negative for color change, pallor and rash.   Neurological: Negative for dizziness, weakness, light-headedness and headaches.   Hematological: Negative for adenopathy.   Psychiatric/Behavioral: Negative for agitation, confusion, dysphoric mood, self-injury and suicidal ideas. The patient is not nervous/anxious.          Objective   /85   Pulse 72   Ht 157.5 cm (62\")   Wt 79.4 kg (175 lb)   LMP 03/09/2018 (Approximate)   Breastfeeding? No   BMI 32.01 kg/m²     General:  well developed; well nourished  no acute distress  appears stated age  obese - Body mass index is 32.01 kg/m².   Skin:  No suspicious lesions seen   Thyroid: normal to inspection and palpation   Lungs:  breathing is unlabored  clear to auscultation bilaterally   Heart:  regular rate and rhythm, S1, S2 normal, no murmur, click, rub or gallop   Breasts:  Not performed.   Abdomen: Not performed.   Pelvis: Clinical staff was present for exam  External genitalia:  normal appearance of the external genitalia including Bartholin's and [place]'s glands.  :  urethral meatus normal;  Vaginal:  normal pink mucosa without prolapse or lesions. discharge present -  " stick yellow-green and thin; vag panel obtained  Cervix:  normal appearance. ectropian present;  Uterus:  normal size, shape and consistency. tenderness to palpation is present;  Adnexa:  tenderness of the bilateral adnexa to  deep palpation;     Lab Review   last pap 10/26/16 normal    Imaging   No data reviewed         Diagnoses and all orders for this visit:    Abnormal uterine bleeding (AUB)  -     US Non-ob Transvaginal; Future    Vaginal discharge  -     Gardnerella vaginalis, Trichomonas vaginalis, Candida albicans, PCR - Swab, Vagina        No orders of the defined types were placed in this encounter.    F/U same day as scan for results. Bleeding likely r/t acute cervicitis but appears to have resolved at this time.    This note was electronically signed.    Syl Murphy, JEANNIE    April 13, 2018

## 2018-04-16 ENCOUNTER — OFFICE VISIT (OUTPATIENT)
Dept: GASTROENTEROLOGY | Facility: CLINIC | Age: 28
End: 2018-04-16

## 2018-04-16 ENCOUNTER — APPOINTMENT (OUTPATIENT)
Dept: LAB | Facility: HOSPITAL | Age: 28
End: 2018-04-16

## 2018-04-16 VITALS
WEIGHT: 175.2 LBS | SYSTOLIC BLOOD PRESSURE: 100 MMHG | DIASTOLIC BLOOD PRESSURE: 66 MMHG | HEART RATE: 72 BPM | BODY MASS INDEX: 32.24 KG/M2 | HEIGHT: 62 IN

## 2018-04-16 DIAGNOSIS — R53.81 MALAISE AND FATIGUE: ICD-10-CM

## 2018-04-16 DIAGNOSIS — K50.00 TERMINAL ILEITIS WITHOUT COMPLICATION (HCC): Primary | ICD-10-CM

## 2018-04-16 DIAGNOSIS — E53.8 VITAMIN B12 DEFICIENCY: ICD-10-CM

## 2018-04-16 DIAGNOSIS — R53.83 MALAISE AND FATIGUE: ICD-10-CM

## 2018-04-16 DIAGNOSIS — R10.84 GENERALIZED ABDOMINAL PAIN: ICD-10-CM

## 2018-04-16 LAB
25(OH)D3 SERPL-MCNC: 25.7 NG/ML (ref 30–100)
ALBUMIN SERPL-MCNC: 4.3 G/DL (ref 3.4–4.8)
ALBUMIN/GLOB SERPL: 1.3 G/DL (ref 1.1–1.8)
ALP SERPL-CCNC: 67 U/L (ref 38–126)
ALT SERPL W P-5'-P-CCNC: 19 U/L (ref 9–52)
ANION GAP SERPL CALCULATED.3IONS-SCNC: 13 MMOL/L (ref 5–15)
AST SERPL-CCNC: 20 U/L (ref 14–36)
BASOPHILS # BLD AUTO: 0.02 10*3/MM3 (ref 0–0.2)
BASOPHILS NFR BLD AUTO: 0.2 % (ref 0–2)
BILIRUB SERPL-MCNC: 0.3 MG/DL (ref 0.2–1.3)
BUN BLD-MCNC: 15 MG/DL (ref 7–21)
BUN/CREAT SERPL: 24.2 (ref 7–25)
CALCIUM SPEC-SCNC: 9 MG/DL (ref 8.4–10.2)
CHLORIDE SERPL-SCNC: 102 MMOL/L (ref 95–110)
CO2 SERPL-SCNC: 29 MMOL/L (ref 22–31)
CREAT BLD-MCNC: 0.62 MG/DL (ref 0.5–1)
DEPRECATED RDW RBC AUTO: 43.9 FL (ref 36.4–46.3)
EOSINOPHIL # BLD AUTO: 0.11 10*3/MM3 (ref 0–0.7)
EOSINOPHIL NFR BLD AUTO: 1.3 % (ref 0–7)
ERYTHROCYTE [DISTWIDTH] IN BLOOD BY AUTOMATED COUNT: 13.7 % (ref 11.5–14.5)
GFR SERPL CREATININE-BSD FRML MDRD: 115 ML/MIN/1.73 (ref 71–165)
GLOBULIN UR ELPH-MCNC: 3.4 GM/DL (ref 2.3–3.5)
GLUCOSE BLD-MCNC: 98 MG/DL (ref 60–100)
HCT VFR BLD AUTO: 37.5 % (ref 35–45)
HGB BLD-MCNC: 12.9 G/DL (ref 12–15.5)
IMM GRANULOCYTES # BLD: 0.03 10*3/MM3 (ref 0–0.02)
IMM GRANULOCYTES NFR BLD: 0.4 % (ref 0–0.5)
LYMPHOCYTES # BLD AUTO: 1.65 10*3/MM3 (ref 0.6–4.2)
LYMPHOCYTES NFR BLD AUTO: 19.5 % (ref 10–50)
MCH RBC QN AUTO: 29.9 PG (ref 26.5–34)
MCHC RBC AUTO-ENTMCNC: 34.4 G/DL (ref 31.4–36)
MCV RBC AUTO: 86.8 FL (ref 80–98)
MONOCYTES # BLD AUTO: 0.45 10*3/MM3 (ref 0–0.9)
MONOCYTES NFR BLD AUTO: 5.3 % (ref 0–12)
NEUTROPHILS # BLD AUTO: 6.21 10*3/MM3 (ref 2–8.6)
NEUTROPHILS NFR BLD AUTO: 73.3 % (ref 37–80)
NRBC BLD MANUAL-RTO: 0 /100 WBC (ref 0–0)
PLATELET # BLD AUTO: 262 10*3/MM3 (ref 150–450)
PMV BLD AUTO: 10.6 FL (ref 8–12)
POTASSIUM BLD-SCNC: 4.1 MMOL/L (ref 3.5–5.1)
PROT SERPL-MCNC: 7.7 G/DL (ref 6.3–8.6)
RBC # BLD AUTO: 4.32 10*6/MM3 (ref 3.77–5.16)
SODIUM BLD-SCNC: 144 MMOL/L (ref 137–145)
VIT B12 BLD-MCNC: 228 PG/ML (ref 239–931)
WBC NRBC COR # BLD: 8.47 10*3/MM3 (ref 3.2–9.8)

## 2018-04-16 PROCEDURE — 82607 VITAMIN B-12: CPT | Performed by: PHYSICIAN ASSISTANT

## 2018-04-16 PROCEDURE — 80053 COMPREHEN METABOLIC PANEL: CPT | Performed by: PHYSICIAN ASSISTANT

## 2018-04-16 PROCEDURE — 85025 COMPLETE CBC W/AUTO DIFF WBC: CPT | Performed by: PHYSICIAN ASSISTANT

## 2018-04-16 PROCEDURE — 82306 VITAMIN D 25 HYDROXY: CPT | Performed by: PHYSICIAN ASSISTANT

## 2018-04-16 PROCEDURE — 36415 COLL VENOUS BLD VENIPUNCTURE: CPT | Performed by: PHYSICIAN ASSISTANT

## 2018-04-16 PROCEDURE — 99213 OFFICE O/P EST LOW 20 MIN: CPT | Performed by: PHYSICIAN ASSISTANT

## 2018-04-16 NOTE — PATIENT INSTRUCTIONS
Irritable Bowel Syndrome, Adult  Irritable bowel syndrome (IBS) is not one specific disease. It is a group of symptoms that affects the organs responsible for digestion (gastrointestinal or GI tract).  To regulate how your GI tract works, your body sends signals back and forth between your intestines and your brain. If you have IBS, there may be a problem with these signals. As a result, your GI tract does not function normally. Your intestines may become more sensitive and overreact to certain things. This is especially true when you eat certain foods or when you are under stress.  There are four types of IBS. These may be determined based on the consistency of your stool:  · IBS with diarrhea.  · IBS with constipation.  · Mixed IBS.  · Unsubtyped IBS.  It is important to know which type of IBS you have. Some treatments are more likely to be helpful for certain types of IBS.  What are the causes?  The exact cause of IBS is not known.  What increases the risk?  You may have a higher risk of IBS if:  · You are a woman.  · You are younger than 45 years old.  · You have a family history of IBS.  · You have mental health problems.  · You have had bacterial infection of your GI tract.  What are the signs or symptoms?  Symptoms of IBS vary from person to person. The main symptom is abdominal pain or discomfort. Additional symptoms usually include one or more of the following:  · Diarrhea, constipation, or both.  · Abdominal swelling or bloating.  · Feeling full or sick after eating a small or regular-size meal.  · Frequent gas.  · Mucus in the stool.  · A feeling of having more stool left after a bowel movement.  Symptoms tend to come and go. They may be associated with stress, psychiatric conditions, or nothing at all.  How is this diagnosed?  There is no specific test to diagnose IBS. Your health care provider will make a diagnosis based on a physical exam, medical history, and your symptoms. You may have other tests to  rule out other conditions that may be causing your symptoms. These may include:  · Blood tests.  · X-rays.  · CT scan.  · Endoscopy and colonoscopy. This is a test in which your GI tract is viewed with a long, thin, flexible tube.  How is this treated?  There is no cure for IBS, but treatment can help relieve symptoms. IBS treatment often includes:  · Changes to your diet, such as:  ¨ Eating more fiber.  ¨ Avoiding foods that cause symptoms.  ¨ Drinking more water.  ¨ Eating regular, medium-sized portioned meals.  · Medicines. These may include:  ¨ Fiber supplements if you have constipation.  ¨ Medicine to control diarrhea (antidiarrheal medicines).  ¨ Medicine to help control muscle spasms in your GI tract (antispasmodic medicines).  ¨ Medicines to help with any mental health issues, such as antidepressants or tranquilizers.  · Therapy.  ¨ Talk therapy may help with anxiety, depression, or other mental health issues that can make IBS symptoms worse.  · Stress reduction.  ¨ Managing your stress can help keep symptoms under control.  Follow these instructions at home:  · Take medicines only as directed by your health care provider.  · Eat a healthy diet.  ¨ Avoid foods and drinks with added sugar.  ¨ Include more whole grains, fruits, and vegetables gradually into your diet. This may be especially helpful if you have IBS with constipation.  ¨ Avoid any foods and drinks that make your symptoms worse. These may include dairy products and caffeinated or carbonated drinks.  ¨ Do not eat large meals.  ¨ Drink enough fluid to keep your urine clear or pale yellow.  · Exercise regularly. Ask your health care provider for recommendations of good activities for you.  · Keep all follow-up visits as directed by your health care provider. This is important.  Contact a health care provider if:  · You have constant pain.  · You have trouble or pain with swallowing.  · You have worsening diarrhea.  Get help right away if:  · You  have severe and worsening abdominal pain.  · You have diarrhea and:  ¨ You have a rash, stiff neck, or severe headache.  ¨ You are irritable, sleepy, or difficult to awaken.  ¨ You are weak, dizzy, or extremely thirsty.  · You have bright red blood in your stool or you have black tarry stools.  · You have unusual abdominal swelling that is painful.  · You vomit continuously.  · You vomit blood (hematemesis).  · You have both abdominal pain and a fever.  This information is not intended to replace advice given to you by your health care provider. Make sure you discuss any questions you have with your health care provider.  Document Released: 12/18/2006 Document Revised: 05/19/2017 Document Reviewed: 09/04/2015  [employer] Interactive Patient Education © 2017 [employer]  MyPlate from [employer]  The general, healthful diet is based on the 2010 Dietary Guidelines for Americans. The amount of food you need to eat from each food group depends on your age, sex, and level of physical activity and can be individualized by a dietitian. Go to ChooseMyPlate.gov for more information.  What do I need to know about the MyPlate plan?  · Enjoy your food, but eat less.  · Avoid oversized portions.  ¨ ½ of your plate should include fruits and vegetables.  ¨ ¼ of your plate should be grains.  ¨ ¼ of your plate should be protein.  Grains   · Make at least half of your grains whole grains.  · For a 2,000 calorie daily food plan, eat 6 oz every day.  · 1 oz is about 1 slice bread, 1 cup cereal, or ½ cup cooked rice, cereal, or pasta.  Vegetables   · Make half your plate fruits and vegetables.  · For a 2,000 calorie daily food plan, eat 2½ cups every day.  · 1 cup is about 1 cup raw or cooked vegetables or vegetable juice or 2 cups raw leafy greens.  Fruits   · Make half your plate fruits and vegetables.  · For a 2,000 calorie daily food plan, eat 2 cups every day.  · 1 cup is about 1 cup fruit or 100% fruit juice or ½ cup dried  fruit.  Protein   · For a 2,000 calorie daily food plan, eat 5½ oz every day.  · 1 oz is about 1 oz meat, poultry, or fish, ¼ cup cooked beans, 1 egg, 1 Tbsp peanut butter, or ½ oz nuts or seeds.  Dairy   · Switch to fat-free or low-fat (1%) milk.  · For a 2,000 calorie daily food plan, eat 3 cups every day.  · 1 cup is about 1 cup milk or yogurt or soy milk (soy beverage), 1½ oz natural cheese, or 2 oz processed cheese.  Fats, Oils, and Empty Calories   · Only small amounts of oils are recommended.  · Empty calories are calories from solid fats or added sugars.  · Compare sodium in foods like soup, bread, and frozen meals. Choose the foods with lower numbers.  · Drink water instead of sugary drinks.  What foods can I eat?  Grains   Whole grains such as whole wheat, quinoa, millet, and bulgur. Bread, rolls, and pasta made from whole grains. Brown or wild rice. Hot or cold cereals made from whole grains and without added sugar.  Vegetables   All fresh vegetables, especially fresh red, dark green, or orange vegetables. Peas and beans. Low-sodium frozen or canned vegetables prepared without added salt. Low-sodium vegetable juices.  Fruits   All fresh, frozen, and dried fruits. Canned fruit packed in water or fruit juice without added sugar. Fruit juices without added sugar.  Meats and Other Protein Sources   Boiled, baked, or grilled lean meat trimmed of fat. Skinless poultry. Fresh seafood and shellfish. Canned seafood packed in water. Unsalted nuts and unsalted nut butters. Tofu. Dried beans and pea. Eggs.  Dairy   Low-fat or fat-free milk, yogurt, and cheeses.  Sweets and Desserts   Frozen desserts made from low-fat milk.  Fats and Oils   Olive, peanut, and canola oils and margarine. Salad dressing and mayonnaise made from these oils.  Other   Soups and casseroles made from allowed ingredients and without added fat or salt.  The items listed above may not be a complete list of recommended foods or beverages. Contact  your dietitian for more options.   What foods are not recommended?  Grains   Sweetened, low-fiber cereals. Packaged baked goods. Snack crackers and chips. Cheese crackers, butter crackers, and biscuits. Frozen waffles, sweet breads, doughnuts, pastries, packaged baking mixes, pancakes, cakes, and cookies.  Vegetables   Regular canned or frozen vegetables or vegetables prepared with salt. Canned tomatoes. Canned tomato sauce. Fried vegetables. Vegetables in cream sauce or cheese sauce.  Fruits   Fruits packed in syrup or made with added sugar.  Meats and Other Protein Sources   Marbled or fatty meats such as ribs. Poultry with skin. Fried meats, poultry, eggs, or fish. Sausages, hot dogs, and deli meats such as pastrami, bologna, or salami.  Dairy   Whole milk, cream, cheeses made from whole milk, sour cream. Ice cream or yogurt made from whole milk or with added sugar.  Beverages   For adults, no more than one alcoholic drink per day. Regular soft drinks or other sugary beverages. Juice drinks.  Sweets and Desserts   Sugary or fatty desserts, candy, and other sweets.  Fats and Oils   Solid shortening or partially hydrogenated oils. Solid margarine. Margarine that contains trans fats. Butter.  The items listed above may not be a complete list of foods and beverages to avoid. Contact your dietitian for more information.   This information is not intended to replace advice given to you by your health care provider. Make sure you discuss any questions you have with your health care provider.  Document Released: 01/06/2009 Document Revised: 05/25/2017 Document Reviewed: 11/26/2014  [employer] Interactive Patient Education © 2017 [employer]  BMI for Adults  Body mass index (BMI) is a number that is calculated from a person's weight and height. In most adults, the number is used to find how much of an adult's weight is made up of fat. BMI is not as accurate as a direct measure of body fat.  How is BMI calculated?  BMI is  calculated by dividing weight in kilograms by height in meters squared. It can also be calculated by dividing weight in pounds by height in inches squared, then multiplying the resulting number by 703. Charts are available to help you find your BMI quickly and easily without doing this calculation.  How is BMI interpreted?  Health care professionals use BMI charts to identify whether an adult is underweight, at a normal weight, or overweight based on the following guidelines:  · Underweight: BMI less than 18.5.  · Normal weight: BMI between 18.5 and 24.9.  · Overweight: BMI between 25 and 29.9.  · Obese: BMI of 30 and above.  BMI is usually interpreted the same for males and females.  Weight includes both fat and muscle, so someone with a muscular build, such as an athlete, may have a BMI that is higher than 24.9. In cases like these, BMI may not accurately depict body fat. To determine if excess body fat is the cause of a BMI of 25 or higher, further assessments may need to be done by a health care provider.  Why is BMI a useful tool?  BMI is used to identify a possible weight problem that may be related to a medical problem or may increase the risk for medical problems. BMI can also be used to promote changes to reach a healthy weight.  This information is not intended to replace advice given to you by your health care provider. Make sure you discuss any questions you have with your health care provider.  Document Released: 08/29/2005 Document Revised: 04/27/2017 Document Reviewed: 05/15/2015  [employer] Interactive Patient Education © 2017 [employer]

## 2018-04-16 NOTE — PROGRESS NOTES
Chief Complaint   Patient presents with   • Heartburn   • Irritable Bowel Syndrome   • Abdominal Pain       ENDO PROCEDURE ORDERED:    Subjective    Renetta Cote is a 27 y.o. female. she is here today for follow-up.    History of Present Illness    Patient seen on a recheck of her GERD, abdominal pain, IBS. Last seen 10/16/2017. Laboratories at that time showed normal TSH, T3, T4, CBC, CMP. Vitamin D was low at 25. She completed 3 months of vitamin D weekly. B12 was low normal at 262. A hCG was negative for pregnancy. She has had no significant laboratory since that time other than for infectious evaluation. She currently denies abdominal pain, heartburn, nausea or vomiting, dysphagia. Bowel movements are regular without blood or mucus. Weight is down 13 pounds since last visit. She states she still gets pain in the abdomen at times. Bowel movements are generally fairly regular without blood or mucus. She has changed her diet. She is eating less. She stopped drinking regular cokes and is doing diet only and has lost 13 pounds since last visit. She did have a previous colonoscopy showing an ileitis on 10/17/2011. She takes Elavil at night as needed.     ASSESSMENT/PLAN: Patient with previous ileitis, previous low-normal B12 and vitamin D deficiency. She appears stable on current regimen. Would consider repeat colonoscopy with look at her terminal ileum with biopsies. I did recommend repeat laboratories to include a B12 and vitamin D. She may need supplementation. We will also check CBC, CMP. As long as she is improved, we will plan followup in 6 months, sooner if needed.       The following portions of the patient's history were reviewed and updated as appropriate:   Past Medical History:   Diagnosis Date   • Anxiety    • Diarrhea    • Dysmenorrhea    • Dysphagia    • Encounter for gynecological examination without abnormal finding    • Encounter for initial prescription of contraceptive pills    • Encounter  for screening for infections with predominantly sexual mode of transmission    • Endometriosis    • Excessive and frequent menstruation with regular cycle    • Generalized abdominal pain    • GERD (gastroesophageal reflux disease)    • Hypertension    • IBS (irritable bowel syndrome)    • Secondary dysmenorrhea    • Stricture of esophagus    • Vitamin D deficiency     Deficiency of vitamin D2 - cont supp, may take 5000U daily        Past Surgical History:   Procedure Laterality Date   • CHOLECYSTECTOMY      Laparoscopic cholecystectomy with operative cholangiogram. Right upper quadrant pain. 10/12/2006       • COLONOSCOPY  10/17/2011   • ENDOSCOPY      Colitis in terminal ileum. Biopsy taken. 10/17/2011       • ENDOSCOPY      Esophageal stricture was present. Dilatation was performed. Esophagitis seen. Biopsy taken. Gastritis was found in the stomach. Biopsy taken. Normal duodenum. 01/12/2015      • ENDOSCOPY      The stomach appeared mike. A single biopsy was obtained and placed on PyloriTek strip. Normal EGD (45.13) 01/05/2007       • EYE SURGERY      Bilateral medial rectus recession. Entropion. 10/31/1994       • OTHER SURGICAL HISTORY      TUBE IMPLANT GENERAL ANESTHETIC 46545 (1)    Bilateral myringototomy with tube insertions and adenoidectomy. 04/18/2001       • UPPER GASTROINTESTINAL ENDOSCOPY  01/12/2015     Family History   Problem Relation Age of Onset   • Diabetes Maternal Aunt    • Hypertension Maternal Aunt    • Heart disease Maternal Grandmother    • Hypertension Maternal Grandmother      OB History     No data available        No Known Allergies  Social History     Social History   • Marital status:      Social History Main Topics   • Smoking status: Former Smoker     Packs/day: 0.25     Years: 1.00     Types: Cigarettes     Start date: 7/19/2016     Quit date: 8/16/2017   • Smokeless tobacco: Never Used   • Alcohol use No   • Drug use: No   • Sexual activity: Defer     Other Topics Concern  "  • Not on file       Current Outpatient Prescriptions:   •  amitriptyline (ELAVIL) 25 MG tablet, Take 1 tablet by mouth At Night As Needed for Sleep., Disp: 30 tablet, Rfl: 2  Review of Systems  Review of Systems       Objective    /66 (BP Location: Left arm)   Pulse 72   Ht 157.5 cm (62\")   Wt 79.5 kg (175 lb 3.2 oz)   LMP 04/09/2018   BMI 32.04 kg/m²   Physical Exam   Constitutional: She is oriented to person, place, and time. She appears well-developed and well-nourished. No distress.   HENT:   Head: Normocephalic and atraumatic.   Eyes: EOM are normal. Pupils are equal, round, and reactive to light.   Neck: Normal range of motion.   Cardiovascular: Normal rate, regular rhythm and normal heart sounds.    Pulmonary/Chest: Effort normal and breath sounds normal.   Abdominal: Soft. Bowel sounds are normal. She exhibits no shifting dullness, no distension, no abdominal bruit, no ascites and no mass. There is no hepatosplenomegaly. There is no tenderness. There is no rigidity, no rebound, no guarding and no CVA tenderness. No hernia. Hernia confirmed negative in the ventral area.   Mild diffuse   Musculoskeletal: Normal range of motion.   Neurological: She is alert and oriented to person, place, and time.   Skin: Skin is warm and dry.   Psychiatric: She has a normal mood and affect. Her behavior is normal. Judgment and thought content normal.   Nursing note and vitals reviewed.    Assessment/Plan      1. Terminal ileitis without complication    2. Generalized abdominal pain    3. Vitamin B12 deficiency    4. Malaise and fatigue    .   Renetta was seen today for heartburn, irritable bowel syndrome and abdominal pain.    Diagnoses and all orders for this visit:    Terminal ileitis without complication  -     Vitamin B12  -     Vitamin D 25 Hydroxy  -     CBC Auto Differential  -     Comprehensive Metabolic Panel    Generalized abdominal pain  -     Vitamin B12  -     Vitamin D 25 Hydroxy  -     CBC Auto " Differential  -     Comprehensive Metabolic Panel    Vitamin B12 deficiency  -     Vitamin B12  -     Vitamin D 25 Hydroxy  -     CBC Auto Differential  -     Comprehensive Metabolic Panel    Malaise and fatigue  -     Vitamin B12  -     Vitamin D 25 Hydroxy  -     CBC Auto Differential  -     Comprehensive Metabolic Panel        Orders placed during this encounter include:  Orders Placed This Encounter   Procedures   • Vitamin B12   • Vitamin D 25 Hydroxy   • CBC Auto Differential   • Comprehensive Metabolic Panel       Medications prescribed:  No orders of the defined types were placed in this encounter.    Discontinued Medications       Reason for Discontinue    Loratadine (CLARITIN) 10 MG capsule *Therapy completed    metroNIDAZOLE (FLAGYL) 500 MG tablet *Therapy completed        Requested Prescriptions      No prescriptions requested or ordered in this encounter       Review and/or summary of lab tests, radiology, procedures, medications. Review and summary of old records and obtaining of history. The risks and benefits of my recommendations, as well as other treatment options were discussed with the patient today. Questions were answered.    Follow-up: Return in about 6 months (around 10/16/2018), or if symptoms worsen or fail to improve.     * Surgery not found *      This document has been electronically signed by Danny Page PA-C on April 16, 2018 6:09 PM      Results for orders placed or performed in visit on 04/16/18   CBC Auto Differential   Result Value Ref Range    WBC 8.47 3.20 - 9.80 10*3/mm3    RBC 4.32 3.77 - 5.16 10*6/mm3    Hemoglobin 12.9 12.0 - 15.5 g/dL    Hematocrit 37.5 35.0 - 45.0 %    MCV 86.8 80.0 - 98.0 fL    MCH 29.9 26.5 - 34.0 pg    MCHC 34.4 31.4 - 36.0 g/dL    RDW 13.7 11.5 - 14.5 %    RDW-SD 43.9 36.4 - 46.3 fl    MPV 10.6 8.0 - 12.0 fL    Platelets 262 150 - 450 10*3/mm3    Neutrophil % 73.3 37.0 - 80.0 %    Lymphocyte % 19.5 10.0 - 50.0 %    Monocyte % 5.3 0.0 - 12.0 %     Eosinophil % 1.3 0.0 - 7.0 %    Basophil % 0.2 0.0 - 2.0 %    Immature Grans % 0.4 0.0 - 0.5 %    Neutrophils, Absolute 6.21 2.00 - 8.60 10*3/mm3    Lymphocytes, Absolute 1.65 0.60 - 4.20 10*3/mm3    Monocytes, Absolute 0.45 0.00 - 0.90 10*3/mm3    Eosinophils, Absolute 0.11 0.00 - 0.70 10*3/mm3    Basophils, Absolute 0.02 0.00 - 0.20 10*3/mm3    Immature Grans, Absolute 0.03 (H) 0.00 - 0.02 10*3/mm3    nRBC 0.0 0.0 - 0.0 /100 WBC   Vitamin D 25 Hydroxy   Result Value Ref Range    25 Hydroxy, Vitamin D 25.7 (L) 30.0 - 100.0 ng/ml   Vitamin B12   Result Value Ref Range    Vitamin B-12 228 (L) 239 - 931 pg/mL   Comprehensive Metabolic Panel   Result Value Ref Range    Glucose 98 60 - 100 mg/dL    BUN 15 7 - 21 mg/dL    Creatinine 0.62 0.50 - 1.00 mg/dL    Sodium 144 137 - 145 mmol/L    Potassium 4.1 3.5 - 5.1 mmol/L    Chloride 102 95 - 110 mmol/L    CO2 29.0 22.0 - 31.0 mmol/L    Calcium 9.0 8.4 - 10.2 mg/dL    Total Protein 7.7 6.3 - 8.6 g/dL    Albumin 4.30 3.40 - 4.80 g/dL    ALT (SGPT) 19 9 - 52 U/L    AST (SGOT) 20 14 - 36 U/L    Alkaline Phosphatase 67 38 - 126 U/L    Total Bilirubin 0.3 0.2 - 1.3 mg/dL    eGFR Non  Amer 115 71 - 165 mL/min/1.73    Globulin 3.4 2.3 - 3.5 gm/dL    A/G Ratio 1.3 1.1 - 1.8 g/dL    BUN/Creatinine Ratio 24.2 7.0 - 25.0    Anion Gap 13.0 5.0 - 15.0 mmol/L   Results for orders placed or performed in visit on 04/13/18   Gardnerella vaginalis, Trichomonas vaginalis, Candida albicans, PCR - Swab, Vagina   Result Value Ref Range    CANDIDA ALBICANS Negative     GARDNERELLA VAGINALIS Positive     TRICHOMONAS VAGINALIS Negative    Results for orders placed or performed during the hospital encounter of 03/14/18   POCT Rapid Strep A   Result Value Ref Range    Rapid Strep A Screen Negative Negative, VALID, INVALID, Not Performed    Internal Control Passed Passed    Lot Number [id number]     Expiration Date 10/2,018    POCT Pregnancy, urine   Result Value Ref Range    HCG, Urine, QL  Negative Negative    Lot Number [id number]     Internal Positive Control Positive     Internal Negative Control Negative    Results for orders placed or performed during the hospital encounter of 12/19/17   POCT Influenza A/B   Result Value Ref Range    Rapid Influenza A Ag Positive     Rapid Influenza B Ag Negative     Internal Control Passed Passed    Lot Number [id number]     Expiration Date 2/2,019    POCT Rapid Strep A   Result Value Ref Range    Rapid Strep A Screen Positive (A) Negative, VALID, INVALID, Not Performed    Internal Control Passed Passed    Lot Number [id number]     Expiration Date 2/2,018    Results for orders placed or performed in visit on 10/17/17   hCG, Serum, Qualitative   Result Value Ref Range    HCG Qualitative Negative Negative   Results for orders placed or performed in visit on 10/16/17   CBC Auto Differential   Result Value Ref Range    WBC 9.03 3.20 - 9.80 10*3/mm3    RBC 4.21 3.77 - 5.16 10*6/mm3    Hemoglobin 12.5 12.0 - 15.5 g/dL    Hematocrit 36.5 35.0 - 45.0 %    MCV 86.7 80.0 - 98.0 fL    MCH 29.7 26.5 - 34.0 pg    MCHC 34.2 31.4 - 36.0 g/dL    RDW 12.9 11.5 - 14.5 %    RDW-SD 41.0 36.4 - 46.3 fl    MPV 10.1 8.0 - 12.0 fL    Platelets 256 150 - 450 10*3/mm3    Neutrophil % 68.7 37.0 - 80.0 %    Lymphocyte % 20.0 10.0 - 50.0 %    Monocyte % 5.9 0.0 - 12.0 %    Eosinophil % 4.9 0.0 - 7.0 %    Basophil % 0.3 0.0 - 2.0 %    Immature Grans % 0.2 0.0 - 0.5 %    Neutrophils, Absolute 6.20 2.00 - 8.60 10*3/mm3    Lymphocytes, Absolute 1.81 0.60 - 4.20 10*3/mm3    Monocytes, Absolute 0.53 0.00 - 0.90 10*3/mm3    Eosinophils, Absolute 0.44 0.00 - 0.70 10*3/mm3    Basophils, Absolute 0.03 0.00 - 0.20 10*3/mm3    Immature Grans, Absolute 0.02 0.00 - 0.02 10*3/mm3   Pregnancy, Urine - Urine, Clean Catch   Result Value Ref Range    HCG, Urine QL Negative Negative   Vitamin D 25 Hydroxy   Result Value Ref Range    25 Hydroxy, Vitamin D 25.0 (L) 30.0 - 100.0 ng/ml   T3, Free   Result Value Ref  Range    T3, Free 3.5 2.0 - 4.4 pg/mL   TSH   Result Value Ref Range    TSH 2.630 0.460 - 4.680 mIU/mL   T4, Free   Result Value Ref Range    Free T4 0.80 0.78 - 2.19 ng/dL   Vitamin B12   Result Value Ref Range    Vitamin B-12 262 239 - 931 pg/mL   Comprehensive Metabolic Panel   Result Value Ref Range    Glucose 93 60 - 100 mg/dL    BUN 11 7 - 21 mg/dL    Creatinine 0.68 0.50 - 1.00 mg/dL    Sodium 143 137 - 145 mmol/L    Potassium 3.7 3.5 - 5.1 mmol/L    Chloride 104 95 - 110 mmol/L    CO2 28.0 22.0 - 31.0 mmol/L    Calcium 8.9 8.4 - 10.2 mg/dL    Total Protein 8.0 6.3 - 8.6 g/dL    Albumin 4.50 3.40 - 4.80 g/dL    ALT (SGPT) 33 9 - 52 U/L    AST (SGOT) 29 14 - 36 U/L    Alkaline Phosphatase 72 38 - 126 U/L    Total Bilirubin 0.6 0.2 - 1.3 mg/dL    eGFR Non  Amer 103 71 - 165 mL/min/1.73    Globulin 3.5 2.3 - 3.5 gm/dL    A/G Ratio 1.3 1.1 - 1.8 g/dL    BUN/Creatinine Ratio 16.2 7.0 - 25.0    Anion Gap 11.0 5.0 - 15.0 mmol/L     *Note: Due to a large number of results and/or encounters for the requested time period, some results have not been displayed. A complete set of results can be found in Results Review.       Some portions of this note have been dictated using voice recognition software and may contain errors and/or omissions.

## 2018-04-17 RX ORDER — ERGOCALCIFEROL 1.25 MG/1
50000 CAPSULE ORAL WEEKLY
Qty: 4 CAPSULE | Refills: 2 | Status: SHIPPED | OUTPATIENT
Start: 2018-04-17 | End: 2018-05-25

## 2018-04-27 ENCOUNTER — OFFICE VISIT (OUTPATIENT)
Dept: OBSTETRICS AND GYNECOLOGY | Facility: CLINIC | Age: 28
End: 2018-04-27

## 2018-04-27 VITALS
HEIGHT: 62 IN | BODY MASS INDEX: 31.65 KG/M2 | SYSTOLIC BLOOD PRESSURE: 108 MMHG | DIASTOLIC BLOOD PRESSURE: 72 MMHG | WEIGHT: 172 LBS

## 2018-04-27 DIAGNOSIS — N83.202 CYSTS OF BOTH OVARIES: Primary | ICD-10-CM

## 2018-04-27 DIAGNOSIS — N83.201 CYSTS OF BOTH OVARIES: Primary | ICD-10-CM

## 2018-04-27 PROCEDURE — 99212 OFFICE O/P EST SF 10 MIN: CPT | Performed by: NURSE PRACTITIONER

## 2018-04-27 NOTE — PROGRESS NOTES
Subjective   Renetta Cote is a 27 y.o. female. Here for u/s results. Bleeding has resolved.    Pelvic Pain   The patient's primary symptoms include pelvic pain. The patient's pertinent negatives include no genital itching, genital lesions, genital odor, genital rash, missed menses, vaginal bleeding or vaginal discharge. This is a new problem. The current episode started 1 to 4 weeks ago. The problem occurs intermittently. The problem has been waxing and waning. The pain is mild. The problem affects both sides. Pertinent negatives include no abdominal pain, constipation, diarrhea, dysuria, headaches, nausea, rash, urgency or vomiting. She is sexually active. No, her partner does not have an STD. She uses nothing for contraception. Her menstrual history has been regular.       The following portions of the patient's history were reviewed and updated as appropriate: allergies, current medications, past medical history, past social history, past surgical history and problem list.    Review of Systems   Constitutional: Negative for activity change, appetite change, fatigue and unexpected weight change.   Respiratory: Negative for chest tightness and shortness of breath.    Cardiovascular: Negative for chest pain, palpitations and leg swelling.   Gastrointestinal: Negative for abdominal distention, abdominal pain, blood in stool, constipation, diarrhea, nausea and vomiting.   Endocrine: Negative for cold intolerance, heat intolerance, polydipsia, polyphagia and polyuria.   Genitourinary: Positive for pelvic pain. Negative for difficulty urinating, dyspareunia, dysuria, genital sores, menstrual problem, missed menses, urgency, vaginal bleeding, vaginal discharge and vaginal pain.   Musculoskeletal: Negative for gait problem and myalgias.   Skin: Negative for rash.   Neurological: Negative for dizziness, weakness, light-headedness and headaches.   Hematological: Negative for adenopathy.       Objective   Physical  Exam   Constitutional: She is oriented to person, place, and time. She appears well-developed and well-nourished.   Cardiovascular: Normal rate.    Pulmonary/Chest: Effort normal.   Neurological: She is alert and oriented to person, place, and time.   Skin: Skin is warm and dry.   Psychiatric: She has a normal mood and affect. Her behavior is normal.   Nursing note and vitals reviewed.    Syl Dejesus MD 4/27/2018    Narrative       Transvaginal pelvic ultrasound non-OB complete    HISTORY: Abnormal uterine and vaginal bleeding.    Transvaginal ultrasound of the pelvis was performed.    COMPARISON: September 12, 2011.    The uterus is normal in position, shape and size.  The uterus measures 7.68 cm in length by 3.19 cm AP by 5.03 cm  transverse.  Endometrium within normal limits at 6.5 mm.    1.9 cm and smaller right ovarian cyst.  1.64 cm and 1 cm left ovarian cysts.  Right ovary 3.13 cm x 2.92 cm x 1.99 cm with a volume of 9.51 mL.  Left ovary 2.88 cm x 2.30 cm x 2.21 cm with a volume of 7.67 mL.  No free fluid.      Impression     CONCLUSION:  Normal uterus.  Endometrium within normal limits at 6.5 mm.  Ovarian cyst as above.  No ultrasound follow-up recommended for these cysts.    [id number]    Electronically signed by:  Alberto Dejesus MD  4/27/2018 10:47 AM  CDT Workstation: [email or web address]         Assessment/Plan   Renetta was seen today for follow-up.    Diagnoses and all orders for this visit:    Cysts of both ovaries       Normal hormonal cysts noted. Otherwise normal pelvic u/s. F/U prn.

## 2018-05-02 ENCOUNTER — CLINICAL SUPPORT (OUTPATIENT)
Dept: GASTROENTEROLOGY | Facility: CLINIC | Age: 28
End: 2018-05-02

## 2018-05-02 DIAGNOSIS — E53.8 VITAMIN B12 DEFICIENCY: Primary | ICD-10-CM

## 2018-05-02 PROCEDURE — 96372 THER/PROPH/DIAG INJ SC/IM: CPT | Performed by: PHYSICIAN ASSISTANT

## 2018-05-02 RX ORDER — CYANOCOBALAMIN 1000 UG/ML
1000 INJECTION, SOLUTION INTRAMUSCULAR; SUBCUTANEOUS
Status: DISCONTINUED | OUTPATIENT
Start: 2018-05-02 | End: 2021-04-20

## 2018-05-02 RX ADMIN — CYANOCOBALAMIN 1000 MCG: 1000 INJECTION, SOLUTION INTRAMUSCULAR; SUBCUTANEOUS at 09:00

## 2018-05-25 ENCOUNTER — OFFICE VISIT (OUTPATIENT)
Dept: OBSTETRICS AND GYNECOLOGY | Facility: CLINIC | Age: 28
End: 2018-05-25

## 2018-05-25 VITALS
SYSTOLIC BLOOD PRESSURE: 112 MMHG | DIASTOLIC BLOOD PRESSURE: 77 MMHG | WEIGHT: 175 LBS | BODY MASS INDEX: 32.2 KG/M2 | HEIGHT: 62 IN

## 2018-05-25 DIAGNOSIS — N80.9 ENDOMETRIOSIS: Primary | ICD-10-CM

## 2018-05-25 PROCEDURE — 99212 OFFICE O/P EST SF 10 MIN: CPT | Performed by: OBSTETRICS & GYNECOLOGY

## 2018-05-25 NOTE — PROGRESS NOTES
"Subjective     Chief Complaint   Patient presents with   • Consult       Renetta Lourdes Cote is a 27 y.o.  presents today to discuss her endometriosis.  She was diagnosed in  following a dx laparoscopy for pelvic pain.  She states since then she has dealt with the pain daily.  The pain is present all the time.  In the past she has tried OCPs and NSAIDs with minimal success.  States she was told she would have a hysterectomy by the age of 20 and would never get pregnant.  Today, she would like to discuss doing a diagnostic laparoscopy to see how \"the endometriosis has progressed.\"  She is still thinking about one more, so she is not ready for any surgical interventions.  Doesn't want to continue NSAIDs or OCPs because \"they don't work.\"    Past Medical History:   Diagnosis Date   • Anxiety    • Diarrhea    • Dysmenorrhea    • Dysphagia    • Encounter for gynecological examination without abnormal finding    • Encounter for initial prescription of contraceptive pills    • Encounter for screening for infections with predominantly sexual mode of transmission    • Endometriosis    • Excessive and frequent menstruation with regular cycle    • Generalized abdominal pain    • GERD (gastroesophageal reflux disease)    • Hypertension    • IBS (irritable bowel syndrome)    • Secondary dysmenorrhea    • Stricture of esophagus    • Vitamin D deficiency     Deficiency of vitamin D2 - cont supp, may take 5000U daily        Past Surgical History:   Procedure Laterality Date   • CHOLECYSTECTOMY      Laparoscopic cholecystectomy with operative cholangiogram. Right upper quadrant pain. 10/12/2006       • COLONOSCOPY  10/17/2011   • ENDOSCOPY      Colitis in terminal ileum. Biopsy taken. 10/17/2011       • ENDOSCOPY      Esophageal stricture was present. Dilatation was performed. Esophagitis seen. Biopsy taken. Gastritis was found in the stomach. Biopsy taken. Normal duodenum. 2015      • ENDOSCOPY      The stomach " "appeared mike. A single biopsy was obtained and placed on PyloriTek strip. Normal EGD (45.13) 01/05/2007       • EYE SURGERY      Bilateral medial rectus recession. Entropion. 10/31/1994       • OTHER SURGICAL HISTORY      TUBE IMPLANT GENERAL ANESTHETIC 80427 (1)    Bilateral myringototomy with tube insertions and adenoidectomy. 04/18/2001       • UPPER GASTROINTESTINAL ENDOSCOPY  01/12/2015     Social History     Social History   • Marital status:      Spouse name: N/A   • Number of children: N/A   • Years of education: N/A     Occupational History   • Not on file.     Social History Main Topics   • Smoking status: Former Smoker     Packs/day: 0.25     Years: 1.00     Types: Cigarettes     Start date: 7/19/2016     Quit date: 8/16/2017   • Smokeless tobacco: Never Used   • Alcohol use No   • Drug use: No   • Sexual activity: Defer     Other Topics Concern   • Not on file     Social History Narrative   • No narrative on file     Family History   Problem Relation Age of Onset   • Diabetes Maternal Aunt    • Hypertension Maternal Aunt    • Heart disease Maternal Grandmother    • Hypertension Maternal Grandmother      Objective      /77   Ht 157.5 cm (62\")   Wt 79.4 kg (175 lb)   LMP 05/18/2018 (Approximate)   BMI 32.01 kg/m²     Physical Exam  General:   Appears stated age, AAOx3, NAD   Abdomen: Soft, nttp, no masses palpated   Neurologic: CN II - XII grossly intact       Assessment/Plan     ASSESSMENT  1. Endometriosis        PLAN  1. Endometriosis  - Patient was seen in 2016 with similar complaints  - Again discussed diagnosis with patient and options for treatment including expectant, hormonal contraceptives, GnRH agonist, and surgery with a PREET, BSO or fulguration of implants. Discussed that a diagnostic laparoscopy \"just to see if it has worsened\" is not warranted.   - Patient does not want hormonal contraceptives or to proceed with fulguration or hysterectomy.  She is interested in Lupron   - " Discussed GnRH agonist and benefits in suppressing the ovary and shrinking implants.  Discussed common side effects including hot flashes, moodiness, and decrease in bone density.  Discussed doing add back therapy to help with this.    - Patient would like to try Lupron with add back therapy.  Will contact company and will call patient when approved by insurance.   - Discussed need to use condoms while on Lupron     Maddie Edmonds MD  5/25/2018

## 2018-08-20 ENCOUNTER — OFFICE VISIT (OUTPATIENT)
Dept: BEHAVIORAL HEALTH | Facility: CLINIC | Age: 28
End: 2018-08-20

## 2018-08-20 DIAGNOSIS — F41.1 GENERALIZED ANXIETY DISORDER: Primary | ICD-10-CM

## 2018-08-20 PROCEDURE — 90834 PSYTX W PT 45 MINUTES: CPT | Performed by: PSYCHOLOGIST

## 2018-08-20 NOTE — PROGRESS NOTES
Patient was seen today for 45 minute therapy appointment    Issues remain the same.  Dealing with her mother, dealing with her sister and sister's  who are now .  She and her live-in boyfriend are fighting for custody of his son.  The patient, her boyfriend, patient's daughter all live together.  Son visits every weekend.  The patient's mother, sister, and sister's  are all engaged in constant drama and the patient gets pulled in to the midst of it.  Mother place favorites among the family also patient feels that her mother is unfair.     Patient states that she is been sleeping good, she does still have episodes of depression, she still has anxiety.  She's had 2 panic attacks in the last 2 months that lasted 15-20 minutes peace.  We talked at length about dealing with her family.  Things are going well with the patient's immediate family, her boyfriend, daughter, and her ex-.  They're able to negotiate things and work out differences.  Patient is not able to go she ate solutions or work out problems with her sister, brother-in-law, and mother.  She needs to not let them pulled her into their drama.

## 2018-08-30 ENCOUNTER — LAB (OUTPATIENT)
Dept: LAB | Facility: HOSPITAL | Age: 28
End: 2018-08-30

## 2018-08-30 ENCOUNTER — TELEPHONE (OUTPATIENT)
Dept: OBSTETRICS AND GYNECOLOGY | Facility: CLINIC | Age: 28
End: 2018-08-30

## 2018-08-30 DIAGNOSIS — Z32.00 ENCOUNTER FOR PREGNANCY TEST, RESULT UNKNOWN: ICD-10-CM

## 2018-08-30 DIAGNOSIS — N91.2 AMENORRHEA: Primary | ICD-10-CM

## 2018-08-30 DIAGNOSIS — Z32.00 ENCOUNTER FOR PREGNANCY TEST, RESULT UNKNOWN: Primary | ICD-10-CM

## 2018-08-30 LAB — HCG INTACT+B SERPL-ACNC: 156.82 MIU/ML

## 2018-08-30 PROCEDURE — 84702 CHORIONIC GONADOTROPIN TEST: CPT

## 2018-08-30 PROCEDURE — 36415 COLL VENOUS BLD VENIPUNCTURE: CPT

## 2018-08-30 NOTE — TELEPHONE ENCOUNTER
----- Message from JEANNIE Castro sent at 8/29/2018  6:00 PM CDT -----  Contact: [phone]  I did not call her and I don't really know why she's saying I told her to call me when she got pregnant other than just to request a quant. I couldn't find anything in my notes saying otherwise. Can you please order a quant to be drawn every 3 days x2 and we'll let her know about the results? Thanks!  ----- Message -----  From: Eugenie Bills MA  Sent: 8/29/2018   2:54 PM  To: JEANNIE Castro        ----- Message -----  From: Tiesha Randolph  Sent: 8/29/2018  12:27 PM  To: Kristin Wells LPN    Pt called and said she took a home test and says that Syl said to call her when she had one come back positive. Please call patient, thanks!

## 2018-09-04 ENCOUNTER — LAB (OUTPATIENT)
Dept: LAB | Facility: HOSPITAL | Age: 28
End: 2018-09-04

## 2018-09-04 DIAGNOSIS — Z32.00 ENCOUNTER FOR PREGNANCY TEST, RESULT UNKNOWN: ICD-10-CM

## 2018-09-04 LAB — HCG INTACT+B SERPL-ACNC: 1581 MIU/ML

## 2018-09-04 PROCEDURE — 84702 CHORIONIC GONADOTROPIN TEST: CPT

## 2018-09-04 PROCEDURE — 36415 COLL VENOUS BLD VENIPUNCTURE: CPT

## 2018-09-14 ENCOUNTER — TELEPHONE (OUTPATIENT)
Dept: OBSTETRICS AND GYNECOLOGY | Facility: CLINIC | Age: 28
End: 2018-09-14

## 2018-09-14 RX ORDER — ONDANSETRON 8 MG/1
8 TABLET, ORALLY DISINTEGRATING ORAL EVERY 8 HOURS PRN
Qty: 20 TABLET | Refills: 6 | Status: SHIPPED | OUTPATIENT
Start: 2018-09-14 | End: 2018-10-31

## 2018-09-14 NOTE — TELEPHONE ENCOUNTER
----- Message from JENANIE Castro sent at 9/14/2018 11:49 AM CDT -----  Yes, please send zofran ODT 8mg 1 tab po q8 hours prn N/V #20 with 6 refills. Thanks!  ----- Message -----  From: Eugenie Bills MA  Sent: 9/14/2018   8:59 AM  To: JEANNIE Castro    Would you like for me to send in any medicine for this pt?  ----- Message -----  From: Angle Martins CNA  Sent: 9/14/2018   8:08 AM  To: Kristin Wells LPN    Pt called and said she has a new OB appointment but not until Oct. She would like to know could she have something for nausea.     Thanks           Notified the pt that we sent over rx for Zofran to her pharmacy, pt requests [facility] in [place], [place].

## 2018-09-18 ENCOUNTER — TELEPHONE (OUTPATIENT)
Dept: OBSTETRICS AND GYNECOLOGY | Facility: CLINIC | Age: 28
End: 2018-09-18

## 2018-09-18 RX ORDER — PROMETHAZINE HYDROCHLORIDE 25 MG/1
25 TABLET ORAL EVERY 6 HOURS PRN
Qty: 20 TABLET | Refills: 2 | Status: SHIPPED | OUTPATIENT
Start: 2018-09-18 | End: 2018-10-31

## 2018-09-18 NOTE — TELEPHONE ENCOUNTER
----- Message from JEANNIE Castro sent at 9/18/2018  2:05 PM CDT -----  Tell her to go to the ER. I can't fix a virus.  ----- Message -----  From: Kristin Wells LPN  Sent: 9/18/2018   1:57 PM  To: JEANNIE Castro        ----- Message -----  From: Angle Martins CNA  Sent: 9/18/2018  12:48 PM  To: Kristin Wells LPN    Pt left a VM stating the medicine is not working and she is still sick.  Please return her call.    Thanks!

## 2018-09-18 NOTE — TELEPHONE ENCOUNTER
C/o nausea and vomiting. Not taking the zofran prescribed. Recommended B12 & unisom, zofran and/or promethazine. Advised to stay hydrated.

## 2018-10-02 ENCOUNTER — APPOINTMENT (OUTPATIENT)
Dept: LAB | Facility: HOSPITAL | Age: 28
End: 2018-10-02

## 2018-10-02 ENCOUNTER — INITIAL PRENATAL (OUTPATIENT)
Dept: OBSTETRICS AND GYNECOLOGY | Facility: CLINIC | Age: 28
End: 2018-10-02

## 2018-10-02 VITALS — BODY MASS INDEX: 31.28 KG/M2 | DIASTOLIC BLOOD PRESSURE: 77 MMHG | WEIGHT: 171 LBS | SYSTOLIC BLOOD PRESSURE: 116 MMHG

## 2018-10-02 DIAGNOSIS — Z36.89 ENCOUNTER FOR OTHER SPECIFIED ANTENATAL SCREENING: ICD-10-CM

## 2018-10-02 DIAGNOSIS — Z36.87 UNSURE OF LAST MENSTRUAL PERIOD AS REASON FOR ULTRASOUND SCAN: Primary | ICD-10-CM

## 2018-10-02 LAB
ABO GROUP BLD: NORMAL
AMPHET+METHAMPHET UR QL: NEGATIVE
BACTERIA UR QL AUTO: ABNORMAL /HPF
BARBITURATES UR QL SCN: NEGATIVE
BASOPHILS # BLD AUTO: 0.01 10*3/MM3 (ref 0–0.2)
BASOPHILS NFR BLD AUTO: 0.1 % (ref 0–2)
BENZODIAZ UR QL SCN: NEGATIVE
BILIRUB UR QL STRIP: NEGATIVE
BLD GP AB SCN SERPL QL: NEGATIVE
CANNABINOIDS SERPL QL: NEGATIVE
CLARITY UR: CLEAR
COCAINE UR QL: NEGATIVE
COLOR UR: ABNORMAL
DEPRECATED RDW RBC AUTO: 40.5 FL (ref 36.4–46.3)
EOSINOPHIL # BLD AUTO: 0.09 10*3/MM3 (ref 0–0.7)
EOSINOPHIL NFR BLD AUTO: 1 % (ref 0–7)
ERYTHROCYTE [DISTWIDTH] IN BLOOD BY AUTOMATED COUNT: 12.8 % (ref 11.5–14.5)
GLUCOSE UR STRIP-MCNC: NEGATIVE MG/DL
HCT VFR BLD AUTO: 36 % (ref 35–45)
HGB BLD-MCNC: 12.5 G/DL (ref 12–15.5)
HGB UR QL STRIP.AUTO: NEGATIVE
HYALINE CASTS UR QL AUTO: ABNORMAL /LPF
IMM GRANULOCYTES # BLD: 0.02 10*3/MM3 (ref 0–0.02)
IMM GRANULOCYTES NFR BLD: 0.2 % (ref 0–0.5)
KETONES UR QL STRIP: NEGATIVE
LEUKOCYTE ESTERASE UR QL STRIP.AUTO: ABNORMAL
LYMPHOCYTES # BLD AUTO: 1.53 10*3/MM3 (ref 0.6–4.2)
LYMPHOCYTES NFR BLD AUTO: 16.9 % (ref 10–50)
Lab: NORMAL
MCH RBC QN AUTO: 30 PG (ref 26.5–34)
MCHC RBC AUTO-ENTMCNC: 34.7 G/DL (ref 31.4–36)
MCV RBC AUTO: 86.3 FL (ref 80–98)
METHADONE UR QL SCN: NEGATIVE
MONOCYTES # BLD AUTO: 0.61 10*3/MM3 (ref 0–0.9)
MONOCYTES NFR BLD AUTO: 6.7 % (ref 0–12)
NEUTROPHILS # BLD AUTO: 6.81 10*3/MM3 (ref 2–8.6)
NEUTROPHILS NFR BLD AUTO: 75.1 % (ref 37–80)
NITRITE UR QL STRIP: NEGATIVE
OPIATES UR QL: NEGATIVE
OXYCODONE UR QL SCN: NEGATIVE
PH UR STRIP.AUTO: 7.5 [PH] (ref 5–9)
PLATELET # BLD AUTO: 269 10*3/MM3 (ref 150–450)
PMV BLD AUTO: 10.4 FL (ref 8–12)
PROT UR QL STRIP: NEGATIVE
RBC # BLD AUTO: 4.17 10*6/MM3 (ref 3.77–5.16)
RBC # UR: ABNORMAL /HPF
REF LAB TEST METHOD: ABNORMAL
RH BLD: POSITIVE
SP GR UR STRIP: 1.02 (ref 1–1.03)
SQUAMOUS #/AREA URNS HPF: ABNORMAL /HPF
UROBILINOGEN UR QL STRIP: ABNORMAL
WBC NRBC COR # BLD: 9.07 10*3/MM3 (ref 3.2–9.8)
WBC UR QL AUTO: ABNORMAL /HPF

## 2018-10-02 PROCEDURE — 80307 DRUG TEST PRSMV CHEM ANLYZR: CPT | Performed by: NURSE PRACTITIONER

## 2018-10-02 PROCEDURE — 86803 HEPATITIS C AB TEST: CPT | Performed by: NURSE PRACTITIONER

## 2018-10-02 PROCEDURE — 81001 URINALYSIS AUTO W/SCOPE: CPT | Performed by: NURSE PRACTITIONER

## 2018-10-02 PROCEDURE — 99214 OFFICE O/P EST MOD 30 MIN: CPT | Performed by: NURSE PRACTITIONER

## 2018-10-02 PROCEDURE — G0432 EIA HIV-1/HIV-2 SCREEN: HCPCS | Performed by: NURSE PRACTITIONER

## 2018-10-02 PROCEDURE — 36415 COLL VENOUS BLD VENIPUNCTURE: CPT | Performed by: NURSE PRACTITIONER

## 2018-10-02 PROCEDURE — 87661 TRICHOMONAS VAGINALIS AMPLIF: CPT | Performed by: NURSE PRACTITIONER

## 2018-10-02 PROCEDURE — 87591 N.GONORRHOEAE DNA AMP PROB: CPT | Performed by: NURSE PRACTITIONER

## 2018-10-02 PROCEDURE — 87491 CHLMYD TRACH DNA AMP PROBE: CPT | Performed by: NURSE PRACTITIONER

## 2018-10-02 RX ORDER — FOLIC ACID 1 MG/1
1 TABLET ORAL DAILY
Qty: 30 TABLET | Refills: 12 | Status: SHIPPED | OUTPATIENT
Start: 2018-10-02 | End: 2018-10-31

## 2018-10-02 RX ORDER — PNV NO.95/FERROUS FUM/FOLIC AC 28MG-0.8MG
1 TABLET ORAL DAILY
Qty: 30 TABLET | Refills: 12 | Status: SHIPPED | OUTPATIENT
Start: 2018-10-02 | End: 2021-04-20

## 2018-10-02 NOTE — PROGRESS NOTES
CC: New OB visit    HPI:  with a hx of endometriosis x18 years. Daughter is 7 years old; her pregnancy was complicated by PIH and she had an uncomplicated vaginal delivery with Dr. Aguero.    ROS: Reports daily nausea and vomiting, unrelieved by zofran or promethazine. Has only taken each medication a couple of times because she is concerned that they'll cause birth defects, despite being educated on each medication. Denies dysuria, vb, LOF, abnormal vaginal d/c, headaches or heartburn.    P/E: see new OB note. Bedside transvaginal scan showed a single, viable IUP with + fetal heart motion. Pt is uncertain of her LMP but states that it was around the end of July.     A/P: 28 y.o.  @ approximately 9 weeks gestation here to initiate OB care.    1. Routine OB care  - SAB precautions  - Encouraged PNV  - Comfort measures, OTC and Rx options for N/V discussed.  - NOB labs and dating scan ordered.  - RTC in 1-2 weeks for dating scan & 4 weeks for MEI visit with MD.

## 2018-10-03 LAB
C TRACH RRNA CVX QL NAA+PROBE: NEGATIVE
HBV SURFACE AG SERPL QL IA: NEGATIVE
HCV AB SER DONR QL: NEGATIVE
HIV1+2 AB SER QL: NEGATIVE
N GONORRHOEA RRNA SPEC QL NAA+PROBE: NEGATIVE
RPR SER QL: NORMAL
RUBV IGG SER QL: ABNORMAL
RUBV IGG SER-ACNC: 106 IU/ML (ref 0–9.9)
TRICHOMONAS VAGINALIS PCR: NEGATIVE

## 2018-10-31 ENCOUNTER — ROUTINE PRENATAL (OUTPATIENT)
Dept: OBSTETRICS AND GYNECOLOGY | Facility: CLINIC | Age: 28
End: 2018-10-31

## 2018-10-31 VITALS — WEIGHT: 169 LBS | DIASTOLIC BLOOD PRESSURE: 65 MMHG | BODY MASS INDEX: 30.91 KG/M2 | SYSTOLIC BLOOD PRESSURE: 125 MMHG

## 2018-10-31 DIAGNOSIS — Z36.3 ENCOUNTER FOR ANTENATAL SCREENING FOR MALFORMATION USING ULTRASOUND: ICD-10-CM

## 2018-10-31 DIAGNOSIS — Z34.82 ENCOUNTER FOR SUPERVISION OF OTHER NORMAL PREGNANCY IN SECOND TRIMESTER: Primary | ICD-10-CM

## 2018-10-31 DIAGNOSIS — Z3A.13 13 WEEKS GESTATION OF PREGNANCY: ICD-10-CM

## 2018-10-31 PROCEDURE — 99212 OFFICE O/P EST SF 10 MIN: CPT | Performed by: OBSTETRICS & GYNECOLOGY

## 2018-10-31 RX ORDER — ONDANSETRON 4 MG/1
4 TABLET, ORALLY DISINTEGRATING ORAL EVERY 8 HOURS PRN
Qty: 30 TABLET | Refills: 2 | Status: SHIPPED | OUTPATIENT
Start: 2018-10-31 | End: 2018-12-11 | Stop reason: HOSPADM

## 2018-10-31 NOTE — PROGRESS NOTES
MEI visit    HPI: still having nausea, would like refill on Zofran.  No bleeding or cramping.      PE - see vitals    A/P: 27 yo  @ 13w6d by LMP c/w 10w evelyn presents for MEI visit.   1. Continue routine prenatal care   - Encourage PNV  - SAB precautions   - A+, RI, HIV neg, RPR NR, HBsAg neg, GC/CT neg/neg  - Discussed genetic and carrier screening; declined both at this time.    - RTC in 5 weeks with anatomy scan at that time.     Maddie JEONG Friday

## 2018-11-02 ENCOUNTER — LAB (OUTPATIENT)
Dept: LAB | Facility: HOSPITAL | Age: 28
End: 2018-11-02

## 2018-11-02 DIAGNOSIS — N91.2 AMENORRHEA: ICD-10-CM

## 2018-11-02 LAB — HCG INTACT+B SERPL-ACNC: ABNORMAL MIU/ML

## 2018-11-02 PROCEDURE — 84702 CHORIONIC GONADOTROPIN TEST: CPT

## 2018-11-07 ENCOUNTER — APPOINTMENT (OUTPATIENT)
Dept: LAB | Facility: HOSPITAL | Age: 28
End: 2018-11-07

## 2018-11-07 ENCOUNTER — RESULTS ENCOUNTER (OUTPATIENT)
Dept: OBSTETRICS AND GYNECOLOGY | Facility: CLINIC | Age: 28
End: 2018-11-07

## 2018-11-07 DIAGNOSIS — Z3A.14 14 WEEKS GESTATION OF PREGNANCY: Primary | ICD-10-CM

## 2018-11-07 DIAGNOSIS — Z3A.14 14 WEEKS GESTATION OF PREGNANCY: ICD-10-CM

## 2018-12-11 ENCOUNTER — ROUTINE PRENATAL (OUTPATIENT)
Dept: OBSTETRICS AND GYNECOLOGY | Facility: CLINIC | Age: 28
End: 2018-12-11

## 2018-12-11 VITALS — SYSTOLIC BLOOD PRESSURE: 109 MMHG | WEIGHT: 172 LBS | DIASTOLIC BLOOD PRESSURE: 68 MMHG | BODY MASS INDEX: 31.46 KG/M2

## 2018-12-11 DIAGNOSIS — Z34.82 ENCOUNTER FOR SUPERVISION OF OTHER NORMAL PREGNANCY IN SECOND TRIMESTER: ICD-10-CM

## 2018-12-11 DIAGNOSIS — Z3A.19 19 WEEKS GESTATION OF PREGNANCY: Primary | ICD-10-CM

## 2018-12-11 DIAGNOSIS — N83.201 CYSTS OF BOTH OVARIES: ICD-10-CM

## 2018-12-11 DIAGNOSIS — N83.202 CYSTS OF BOTH OVARIES: ICD-10-CM

## 2018-12-11 PROCEDURE — 99212 OFFICE O/P EST SF 10 MIN: CPT | Performed by: ADVANCED PRACTICE MIDWIFE

## 2018-12-15 NOTE — PROGRESS NOTES
CC: MEI visit, history reviewed, no changes noted    ROS:Positive No complaints   Negative leaking fluid from the vagina, swelling in her legs, headache, visual changes, low back pain and heartburn      Educated on:US results & needs for F/U due to inadequate views of the heart in 4 weeks    A/Plan: f/u in 4 week/s   Diagnoses and all orders for this visit:    19 weeks gestation of pregnancy  -     US Ob Follow Up Transabdominal Approach; Future    Encounter for supervision of other normal pregnancy in second trimester    Cysts of both ovaries  -     US Ob Follow Up Transabdominal Approach; Future

## 2019-01-08 ENCOUNTER — ROUTINE PRENATAL (OUTPATIENT)
Dept: OBSTETRICS AND GYNECOLOGY | Facility: CLINIC | Age: 29
End: 2019-01-08

## 2019-01-08 VITALS — SYSTOLIC BLOOD PRESSURE: 111 MMHG | WEIGHT: 175.2 LBS | DIASTOLIC BLOOD PRESSURE: 71 MMHG | BODY MASS INDEX: 32.04 KG/M2

## 2019-01-08 DIAGNOSIS — K58.0 IRRITABLE BOWEL SYNDROME WITH DIARRHEA: ICD-10-CM

## 2019-01-08 DIAGNOSIS — F41.1 GENERALIZED ANXIETY DISORDER: ICD-10-CM

## 2019-01-08 DIAGNOSIS — Z3A.23 23 WEEKS GESTATION OF PREGNANCY: Primary | ICD-10-CM

## 2019-01-08 DIAGNOSIS — Z13.1 SCREENING FOR DIABETES MELLITUS (DM): ICD-10-CM

## 2019-01-08 DIAGNOSIS — Z03.79 SUSPECTED FETAL CONDITION NOT FOUND: ICD-10-CM

## 2019-01-08 PROCEDURE — 99213 OFFICE O/P EST LOW 20 MIN: CPT | Performed by: OBSTETRICS & GYNECOLOGY

## 2019-01-08 NOTE — PROGRESS NOTES
Chief Complaint   Patient presents with   • OB Follow up   • High Risk Gestation     Renetta Cote is a 28 y.o. year old .     29 yo  c/w 10w sono   MELISA May 2, 2019 by LMP.  Dating ultrasound is within 4 days with MELISA  May 6, 2019.     - A+, RI, HIV neg, RPR NR, HBsAg neg, GC/CT neg/neg  - Discussed genetic and carrier screening; declined both at this time.      Having a girl named Yamileth Fernandes.    20 week ultrasound showed no abnormalities but was limited.    2019 at 23 weeks and 4 days ultrasound shows single intrauterine pregnancy in the vertex position with again some of the anatomic features limited by the fetal position.  Cervical length 4.51 cm.  Recommend repeat ultrasound in 3 weeks.    2019, maternal blood type A positive.  One hour Glucola and CBC in 3 weeks with follow-up ultrasound.        Obstetric History  #: 1, Date: 04/01/10, Sex: Female, Weight: 2551 g (5 lb 10 oz), GA: 38w0d, Delivery: Vaginal, Spontaneous, Apgar1: None, Apgar5: None, Living: Living, Birth Comments: jose f    #: 2, Date: None, Sex: None, Weight: None, GA: None, Delivery: None, Apgar1: None, Apgar5: None, Living: None, Birth Comments: None      The patient complains of the following: No new complaints    ROS  Vaginal bleeding: No   Nausea: No   Diarrhea: No   Constipation: No   Other:      Lab Results   Component Value Date    ABO A 10/02/2018    RH Positive 10/02/2018    ABSCRN Negative 10/02/2018       Specific topics discussed at today's visit: Ultrasound results  Tests done today: none  Tests to be done at the next visit: U/S to complete the anatomic screening  CBC  1 Hour Glucola    Renetta was seen today for ob follow up and high risk gestation.    Diagnoses and all orders for this visit:    23 weeks gestation of pregnancy    Screening for diabetes mellitus (DM)  -     Glucose, Post 50 Gm Glucola  -     CBC & Differential    Suspected fetal condition not found  -     US Ob Follow  Up Transabdominal Approach; Future    Generalized anxiety disorder    Irritable bowel syndrome with diarrhea

## 2019-01-09 DIAGNOSIS — N83.202 CYSTS OF BOTH OVARIES: ICD-10-CM

## 2019-01-09 DIAGNOSIS — N83.201 CYSTS OF BOTH OVARIES: ICD-10-CM

## 2019-01-09 DIAGNOSIS — Z3A.19 19 WEEKS GESTATION OF PREGNANCY: ICD-10-CM

## 2019-01-29 ENCOUNTER — ROUTINE PRENATAL (OUTPATIENT)
Dept: OBSTETRICS AND GYNECOLOGY | Facility: CLINIC | Age: 29
End: 2019-01-29

## 2019-01-29 ENCOUNTER — APPOINTMENT (OUTPATIENT)
Dept: LAB | Facility: HOSPITAL | Age: 29
End: 2019-01-29

## 2019-01-29 VITALS — DIASTOLIC BLOOD PRESSURE: 58 MMHG | WEIGHT: 183 LBS | BODY MASS INDEX: 33.47 KG/M2 | SYSTOLIC BLOOD PRESSURE: 97 MMHG

## 2019-01-29 DIAGNOSIS — Z3A.26 26 WEEKS GESTATION OF PREGNANCY: Primary | ICD-10-CM

## 2019-01-29 DIAGNOSIS — O36.5920: ICD-10-CM

## 2019-01-29 DIAGNOSIS — K58.0 IRRITABLE BOWEL SYNDROME WITH DIARRHEA: ICD-10-CM

## 2019-01-29 PROBLEM — IMO0002 POOR FETAL GROWTH: Status: ACTIVE | Noted: 2019-01-29

## 2019-01-29 LAB
BASOPHILS # BLD AUTO: 0.01 10*3/MM3 (ref 0–0.2)
BASOPHILS NFR BLD AUTO: 0.1 % (ref 0–2)
DEPRECATED RDW RBC AUTO: 44.4 FL (ref 36.4–46.3)
EOSINOPHIL # BLD AUTO: 0.2 10*3/MM3 (ref 0–0.7)
EOSINOPHIL NFR BLD AUTO: 1.5 % (ref 0–7)
ERYTHROCYTE [DISTWIDTH] IN BLOOD BY AUTOMATED COUNT: 13.8 % (ref 11.5–14.5)
GLUCOSE 1H P 100 G GLC PO SERPL-MCNC: 127 MG/DL (ref 60–140)
HCT VFR BLD AUTO: 31.1 % (ref 35–45)
HGB BLD-MCNC: 10.4 G/DL (ref 12–15.5)
IMM GRANULOCYTES # BLD AUTO: 0.06 10*3/MM3 (ref 0–0.02)
IMM GRANULOCYTES NFR BLD AUTO: 0.5 % (ref 0–0.5)
LYMPHOCYTES # BLD AUTO: 1.81 10*3/MM3 (ref 0.6–4.2)
LYMPHOCYTES NFR BLD AUTO: 13.6 % (ref 10–50)
MCH RBC QN AUTO: 29.2 PG (ref 26.5–34)
MCHC RBC AUTO-ENTMCNC: 33.4 G/DL (ref 31.4–36)
MCV RBC AUTO: 87.4 FL (ref 80–98)
MONOCYTES # BLD AUTO: 0.53 10*3/MM3 (ref 0–0.9)
MONOCYTES NFR BLD AUTO: 4 % (ref 0–12)
NEUTROPHILS # BLD AUTO: 10.71 10*3/MM3 (ref 2–8.6)
NEUTROPHILS NFR BLD AUTO: 80.3 % (ref 37–80)
PLATELET # BLD AUTO: 264 10*3/MM3 (ref 150–450)
PMV BLD AUTO: 8.9 FL (ref 8–12)
RBC # BLD AUTO: 3.56 10*6/MM3 (ref 3.77–5.16)
WBC NRBC COR # BLD: 13.32 10*3/MM3 (ref 3.2–9.8)

## 2019-01-29 PROCEDURE — 82950 GLUCOSE TEST: CPT | Performed by: OBSTETRICS & GYNECOLOGY

## 2019-01-29 PROCEDURE — 85025 COMPLETE CBC W/AUTO DIFF WBC: CPT | Performed by: OBSTETRICS & GYNECOLOGY

## 2019-01-29 PROCEDURE — 36415 COLL VENOUS BLD VENIPUNCTURE: CPT | Performed by: OBSTETRICS & GYNECOLOGY

## 2019-01-29 PROCEDURE — 99213 OFFICE O/P EST LOW 20 MIN: CPT | Performed by: OBSTETRICS & GYNECOLOGY

## 2019-01-29 NOTE — PROGRESS NOTES
Chief Complaint   Patient presents with   • High Risk Gestation     Renetta Cote is a 28 y.o. year old .      29 yo  c/w 10w sono   MELISA May 2, 2019 by LMP.  Dating ultrasound is within 4 days with MELISA  May 6, 2019.     - A+, RI, HIV neg, RPR NR, HBsAg neg, GC/CT neg/neg  - Discussed genetic and carrier screening; declined both at this time.       Having a girl named Yamileth Fernandes.     20 week ultrasound showed no abnormalities but was limited.     2019 at 23 weeks and 4 days ultrasound shows single intrauterine pregnancy in the vertex position with again some of the anatomic features limited by the fetal position.  Cervical length 4.51 cm.  Recommend repeat ultrasound in 3 weeks.     2019, maternal blood type A positive.  One hour Glucola and CBC in 3 weeks with follow-up ultrasound.    2019 single live intrauterine pregnancy at 26 weeks and 5 days.  Measurements are 1 week and 2 days behind stated gestational age.  Previously is suboptimal anatomy is visualized and appears normal with suboptimal views of the lumbar and sacral spine.  Normal amniotic fluid.  Normal cervical length.  Reassuring biophysical profile of 8 out of 8.  Umbilical and uterine artery Doppler changes consistent with an increased risk for IUGR, preeclampsia, stillbirth.  Recommend ultrasounds in 2 weeks for growth, biophysical profile, and Dopplers.    CBC and one-hour Glucola performed 2019.    Obstetric History  #: 1, Date: 04/01/10, Sex: Female, Weight: 2551 g (5 lb 10 oz), GA: 38w0d, Delivery: Vaginal, Spontaneous, Apgar1: None, Apgar5: None, Living: Living, Birth Comments: jose f    #: 2, Date: None, Sex: None, Weight: None, GA: None, Delivery: None, Apgar1: None, Apgar5: None, Living: None, Birth Comments: None      The patient complains of the following: No new complaints    ROS  Vaginal bleeding: No   Nausea: No   Diarrhea: No   Constipation: No   Other:      Lab  Results   Component Value Date    ABO A 10/02/2018    RH Positive 10/02/2018    ABSCRN Negative 10/02/2018       Specific topics discussed at today's visit: Ultrasound findings  Tests done today: P -  8 / 8  Tests to be done at the next visit: SHELLEY Jackson was seen today for high risk gestation.    Diagnoses and all orders for this visit:    26 weeks gestation of pregnancy    Poor fetal growth affecting pregnancy in second trimester, single or unspecified fetus  -     US Ob Follow Up Transabdominal Approach; Standing  -     US Fetal Biophysical Profile;Without Non-Stress Testing; Standing    Irritable bowel syndrome with diarrhea

## 2019-01-30 DIAGNOSIS — O36.5920: ICD-10-CM

## 2019-02-08 RX ORDER — FERROUS SULFATE 325(65) MG
325 TABLET ORAL
Qty: 60 TABLET | Refills: 10 | Status: SHIPPED | OUTPATIENT
Start: 2019-02-08 | End: 2020-02-17

## 2019-02-12 ENCOUNTER — ROUTINE PRENATAL (OUTPATIENT)
Dept: OBSTETRICS AND GYNECOLOGY | Facility: CLINIC | Age: 29
End: 2019-02-12

## 2019-02-12 VITALS — BODY MASS INDEX: 33.29 KG/M2 | SYSTOLIC BLOOD PRESSURE: 102 MMHG | DIASTOLIC BLOOD PRESSURE: 60 MMHG | WEIGHT: 182 LBS

## 2019-02-12 DIAGNOSIS — O99.013 ANEMIA DURING PREGNANCY IN THIRD TRIMESTER: ICD-10-CM

## 2019-02-12 DIAGNOSIS — Z3A.28 28 WEEKS GESTATION OF PREGNANCY: Primary | ICD-10-CM

## 2019-02-12 DIAGNOSIS — O36.5930 IUGR (INTRAUTERINE GROWTH RESTRICTION) AFFECTING CARE OF MOTHER, THIRD TRIMESTER, NOT APPLICABLE OR UNSPECIFIED FETUS: ICD-10-CM

## 2019-02-12 DIAGNOSIS — K58.0 IRRITABLE BOWEL SYNDROME WITH DIARRHEA: ICD-10-CM

## 2019-02-12 PROCEDURE — 99213 OFFICE O/P EST LOW 20 MIN: CPT | Performed by: OBSTETRICS & GYNECOLOGY

## 2019-02-12 PROCEDURE — 59025 FETAL NON-STRESS TEST: CPT | Performed by: OBSTETRICS & GYNECOLOGY

## 2019-02-13 NOTE — PROGRESS NOTES
Chief Complaint   Patient presents with   • High Risk Gestation       Renetta Cote is a 28 y.o. year old .      29 yo  c/w 10w sono   MELISA May 2, 2019 by LMP.  Dating ultrasound is within 4 days with MELISA  May 6, 2019.     - A+, RI, HIV neg, RPR NR, HBsAg neg, GC/CT neg/neg  - Discussed genetic and carrier screening; declined both at this time.       Having a girl named Yamileth Fernandes.     20 week ultrasound showed no abnormalities but was limited.     2019 at 23 weeks and 4 days ultrasound shows single intrauterine pregnancy in the vertex position with again some of the anatomic features limited by the fetal position.  Cervical length 4.51 cm.  Recommend repeat ultrasound in 3 weeks.     2019, maternal blood type A positive.  One hour Glucola and CBC in 3 weeks with follow-up ultrasound.     2019 single live intrauterine pregnancy at 26 weeks and 5 days.  Measurements are 1 week and 2 days behind stated gestational age.  Previously is suboptimal anatomy is visualized and appears normal with suboptimal views of the lumbar and sacral spine.  Normal amniotic fluid.  Normal cervical length.  Reassuring biophysical profile of 8 out of 8.  Umbilical and uterine artery Doppler changes consistent with an increased risk for IUGR, preeclampsia, stillbirth.  Recommend ultrasounds in 2 weeks for growth, biophysical profile, and Dopplers.     CBC and one-hour Glucola performed 2019.  One hour glucola 127.  Hg.4.  Hct 31.1.  Platelets 264,000.  She was placed on iron therapy.    2019 ultrasound shows single intrauterine pregnancy in the cephalic position fetal heart rate 150 bpm.  NURYS is normal at 13.16 cm.  Biophysical profile 6 out of 8.  Reactive NST making biophysical profile 8 out of 10.  Estimated fetal weight 2 pounds 7 ounces or 9.5 percentile.  Umbilical artery S/D ratio 2.9 and 2.6.    Obstetric History  #: 1, Date: 04/01/10, Sex: Female,  Weight: 2551 g (5 lb 10 oz), GA: 38w0d, Delivery: Vaginal, Spontaneous, Apgar1: None, Apgar5: None, Living: Living, Birth Comments: jose f    #: 2, Date: None, Sex: None, Weight: None, GA: None, Delivery: None, Apgar1: None, Apgar5: None, Living: None, Birth Comments: None      The patient complains of the following: No new complaints    ROS  Vaginal bleeding: No   Nausea: No   Diarrhea: No   Constipation: No   Other:      Lab Results   Component Value Date    ABO A 10/02/2018    RH Positive 10/02/2018    ABSCRN Negative 10/02/2018       Specific topics discussed at today's visit: fetal kick counts,  labor precautions and Anemia  Tests done today: BPP -  6 / 8 NST reactive making biophysical profile 8 out of 10  Tests to be done at the next visit: RAMON    Renetta was seen today for high risk gestation.    Diagnoses and all orders for this visit:    28 weeks gestation of pregnancy    IUGR (intrauterine growth restriction) affecting care of mother, third trimester, not applicable or unspecified fetus  -     US Fetal Biophysical Profile;Without Non-Stress Testing; Standing    Irritable bowel syndrome with diarrhea    Anemia during pregnancy in third trimester

## 2019-02-15 ENCOUNTER — RESULTS ENCOUNTER (OUTPATIENT)
Dept: OBSTETRICS AND GYNECOLOGY | Facility: CLINIC | Age: 29
End: 2019-02-15

## 2019-02-15 DIAGNOSIS — O36.5930 IUGR (INTRAUTERINE GROWTH RESTRICTION) AFFECTING CARE OF MOTHER, THIRD TRIMESTER, NOT APPLICABLE OR UNSPECIFIED FETUS: ICD-10-CM

## 2019-02-19 ENCOUNTER — ROUTINE PRENATAL (OUTPATIENT)
Dept: OBSTETRICS AND GYNECOLOGY | Facility: CLINIC | Age: 29
End: 2019-02-19

## 2019-02-19 VITALS — DIASTOLIC BLOOD PRESSURE: 60 MMHG | SYSTOLIC BLOOD PRESSURE: 100 MMHG | WEIGHT: 184 LBS | BODY MASS INDEX: 33.65 KG/M2

## 2019-02-19 DIAGNOSIS — O36.5930 IUGR (INTRAUTERINE GROWTH RESTRICTION) AFFECTING CARE OF MOTHER, THIRD TRIMESTER, NOT APPLICABLE OR UNSPECIFIED FETUS: ICD-10-CM

## 2019-02-19 DIAGNOSIS — O99.013 ANEMIA DURING PREGNANCY IN THIRD TRIMESTER: ICD-10-CM

## 2019-02-19 DIAGNOSIS — O99.213 OBESITY COMPLICATING PREGNANCY IN THIRD TRIMESTER: ICD-10-CM

## 2019-02-19 DIAGNOSIS — K58.0 IRRITABLE BOWEL SYNDROME WITH DIARRHEA: ICD-10-CM

## 2019-02-19 DIAGNOSIS — Z81.8 FAMILY HISTORY OF AUTISM: ICD-10-CM

## 2019-02-19 DIAGNOSIS — Z3A.29 29 WEEKS GESTATION OF PREGNANCY: Primary | ICD-10-CM

## 2019-02-19 DIAGNOSIS — O36.5930 INTRAUTERINE GROWTH RETARDATION AFFECTING MOTHER, ANTEPARTUM, THIRD TRIMESTER, NOT APPLICABLE OR UNSPECIFIED FETUS: ICD-10-CM

## 2019-02-19 PROCEDURE — 99213 OFFICE O/P EST LOW 20 MIN: CPT | Performed by: OBSTETRICS & GYNECOLOGY

## 2019-02-20 NOTE — PROGRESS NOTES
Chief Complaint   Patient presents with   • OB Follow up   • High Risk Gestation      Renetta Cote is a 28 y.o. year old .      29 yo  c/w 10w sono   MELISA May 2, 2019 by LMP.  Dating ultrasound is within 4 days with MELISA  May 6, 2019.     - A+, RI, HIV neg, RPR NR, HBsAg neg, GC/CT neg/neg  - Discussed genetic and carrier screening; declined both at this time.       Having a girl named Yamileth Fernandes.     20 week ultrasound showed no abnormalities but was limited.     2019 at 23 weeks and 4 days ultrasound shows single intrauterine pregnancy in the vertex position with again some of the anatomic features limited by the fetal position.  Cervical length 4.51 cm.  Recommend repeat ultrasound in 3 weeks.     2019, maternal blood type A positive.  One hour Glucola and CBC in 3 weeks with follow-up ultrasound.     2019 single live intrauterine pregnancy at 26 weeks and 5 days.  Measurements are 1 week and 2 days behind stated gestational age.  Previously is suboptimal anatomy is visualized and appears normal with suboptimal views of the lumbar and sacral spine.  Normal amniotic fluid.  Normal cervical length.  Reassuring biophysical profile of 8 out of 8.  Umbilical and uterine artery Doppler changes consistent with an increased risk for IUGR, preeclampsia, stillbirth.  Recommend ultrasounds in 2 weeks for growth, biophysical profile, and Dopplers.     CBC and one-hour Glucola performed 2019.  One hour glucola 127.  Hg.4.  Hct 31.1.  Platelets 264,000.  She was placed on iron therapy.     2019 ultrasound shows single intrauterine pregnancy in the cephalic position fetal heart rate 150 bpm.  NURYS is normal at 13.16 cm.  Biophysical profile 6 out of 8.  Reactive NST making biophysical profile 8 out of 10.  Estimated fetal weight 2 pounds 7 ounces or 9.5 percentile.  Umbilical artery S/D ratio 2.9 and 2.6.    2019, ultrasound shows  single intrauterine pregnancy in the cephalic position with fetal heart rate 129 bpm.  NURYS is normal at 19.48 cm.  Biophysical profile 8 out of 8.  Estimated fetal weight 2 pounds 12 ounces or 10th percentile.  Continue weekly biophysical profiles with Dopplers.    Obstetric History  #: 1, Date: 04/01/10, Sex: Female, Weight: 2551 g (5 lb 10 oz), GA: 38w0d, Delivery: Vaginal, Spontaneous, Apgar1: None, Apgar5: None, Living: Living, Birth Comments: jose f    #: 2, Date: None, Sex: None, Weight: None, GA: None, Delivery: None, Apgar1: None, Apgar5: None, Living: None, Birth Comments: None      ROS  Headache: No   Visual changes: No   Swelling in legs: No   Nausea: No   Constipation: No   Diarrhea: No   Contractions: No   Leaking fluid: No   Vaginal bleeding: No   Other:      Lab Results   Component Value Date    HGB 10.4 (L) 2019    HCT 31.1 (L) 2019    ABO A 10/02/2018    RH Positive 10/02/2018    ABSCRN Negative 10/02/2018       Specific topics discussed at today's visit: fetal kick counts and  labor precautions  Tests done today: BP - 8 / 8  Tests to be done at the next visit: SHELLEY Jackson was seen today for ob follow up and high risk gestation.    Diagnoses and all orders for this visit:    29 weeks gestation of pregnancy    IUGR (intrauterine growth restriction) affecting care of mother, third trimester, not applicable or unspecified fetus    Anemia during pregnancy in third trimester    Irritable bowel syndrome with diarrhea

## 2019-02-21 DIAGNOSIS — IMO0002 POOR FETAL GROWTH: Primary | ICD-10-CM

## 2019-02-22 ENCOUNTER — RESULTS ENCOUNTER (OUTPATIENT)
Dept: OBSTETRICS AND GYNECOLOGY | Facility: CLINIC | Age: 29
End: 2019-02-22

## 2019-02-22 DIAGNOSIS — O36.5930 IUGR (INTRAUTERINE GROWTH RESTRICTION) AFFECTING CARE OF MOTHER, THIRD TRIMESTER, NOT APPLICABLE OR UNSPECIFIED FETUS: ICD-10-CM

## 2019-02-26 ENCOUNTER — ROUTINE PRENATAL (OUTPATIENT)
Dept: OBSTETRICS AND GYNECOLOGY | Facility: CLINIC | Age: 29
End: 2019-02-26

## 2019-02-26 VITALS — SYSTOLIC BLOOD PRESSURE: 98 MMHG | DIASTOLIC BLOOD PRESSURE: 62 MMHG | WEIGHT: 183.8 LBS | BODY MASS INDEX: 33.62 KG/M2

## 2019-02-26 DIAGNOSIS — O36.5930 IUGR (INTRAUTERINE GROWTH RESTRICTION) AFFECTING CARE OF MOTHER, THIRD TRIMESTER, NOT APPLICABLE OR UNSPECIFIED FETUS: ICD-10-CM

## 2019-02-26 DIAGNOSIS — IMO0002 POOR FETAL GROWTH: ICD-10-CM

## 2019-02-26 DIAGNOSIS — Z3A.30 30 WEEKS GESTATION OF PREGNANCY: Primary | ICD-10-CM

## 2019-02-26 DIAGNOSIS — O99.013 ANEMIA DURING PREGNANCY IN THIRD TRIMESTER: ICD-10-CM

## 2019-02-26 PROCEDURE — 99213 OFFICE O/P EST LOW 20 MIN: CPT | Performed by: OBSTETRICS & GYNECOLOGY

## 2019-02-26 NOTE — PROGRESS NOTES
Chief Complaint   Patient presents with   • OB Follow up   • High Risk Gestation     Renetta Cote is a 28 y.o. year old .      29 yo  c/w 10w sono   MELISA May 2, 2019 by LMP.  Dating ultrasound is within 4 days with MELISA  May 6, 2019.     - A+, RI, HIV neg, RPR NR, HBsAg neg, GC/CT neg/neg  - Discussed genetic and carrier screening; declined both at this time.       Having a girl named Yamileth Fernandes.     20 week ultrasound showed no abnormalities but was limited.     2019 at 23 weeks and 4 days ultrasound shows single intrauterine pregnancy in the vertex position with again some of the anatomic features limited by the fetal position.  Cervical length 4.51 cm.  Recommend repeat ultrasound in 3 weeks.     2019, maternal blood type A positive.  One hour Glucola and CBC in 3 weeks with follow-up ultrasound.     2019 single live intrauterine pregnancy at 26 weeks and 5 days.  Measurements are 1 week and 2 days behind stated gestational age.  Previously is suboptimal anatomy is visualized and appears normal with suboptimal views of the lumbar and sacral spine.  Normal amniotic fluid.  Normal cervical length.  Reassuring biophysical profile of 8 out of 8.  Umbilical and uterine artery Doppler changes consistent with an increased risk for IUGR, preeclampsia, stillbirth.  Recommend ultrasounds in 2 weeks for growth, biophysical profile, and Dopplers.     CBC and one-hour Glucola performed 2019.  One hour glucola 127.  Hg.4.  Hct 31.1.  Platelets 264,000.  She was placed on iron therapy.     2019 ultrasound shows single intrauterine pregnancy in the cephalic position fetal heart rate 150 bpm.  NURYS is normal at 13.16 cm.  Biophysical profile 6 out of 8.  Reactive NST making biophysical profile 8 out of 10.  Estimated fetal weight 2 pounds 7 ounces or 9.5 percentile.  Umbilical artery S/D ratio 2.9 and 2.6.     2019, ultrasound shows  single intrauterine pregnancy in the cephalic position with fetal heart rate 129 bpm.  NURYS is normal at 19.48 cm.  Biophysical profile 8 out of 8.  Estimated fetal weight 2 pounds 12 ounces or 10th percentile.  Continue weekly biophysical profiles with Dopplers.     2019 ultrasound shows single intrauterine pregnancy in the cephalic position with fetal heart rate 136 bpm.  NURYS 14.79 cm.  Biophysical profile 8 out of 8.  Biophysical profile and Dopplers and ultrasound for growth in 1 week.    Obstetric History  #: 1, Date: 04/01/10, Sex: Female, Weight: 2551 g (5 lb 10 oz), GA: 38w0d, Delivery: Vaginal, Spontaneous, Apgar1: None, Apgar5: None, Living: Living, Birth Comments: jose f    #: 2, Date: None, Sex: None, Weight: None, GA: None, Delivery: None, Apgar1: None, Apgar5: None, Living: None, Birth Comments: None      The patient complains of the following: No new complaints    ROS  Headache: No   Visual changes: No   Swelling in legs: No   Nausea: No   Constipation: No   Diarrhea: No   Contractions: No   Leaking fluid: No   Vaginal bleeding: No   Other:      Specific topics discussed at today's visit: fetal kick counts and  labor precautions  Tests done today: BPP - 8 / 8  Tests to be done at the next visit: BPP  U/S for CARLA     Renetta was seen today for ob follow up and high risk gestation.    Diagnoses and all orders for this visit:    30 weeks gestation of pregnancy    IUGR (intrauterine growth restriction) affecting care of mother, third trimester, not applicable or unspecified fetus    Anemia during pregnancy in third trimester    Poor fetal growth  -     US Fetal Biophysical Profile;Without Non-Stress Testing; Future  -     US Ob Follow Up Transabdominal Approach; Future

## 2019-02-28 DIAGNOSIS — O36.5930 INTRAUTERINE GROWTH RETARDATION AFFECTING MOTHER, ANTEPARTUM, THIRD TRIMESTER, NOT APPLICABLE OR UNSPECIFIED FETUS: ICD-10-CM

## 2019-02-28 DIAGNOSIS — Z81.8 FAMILY HISTORY OF AUTISM: ICD-10-CM

## 2019-02-28 DIAGNOSIS — K58.0 IRRITABLE BOWEL SYNDROME WITH DIARRHEA: ICD-10-CM

## 2019-02-28 DIAGNOSIS — O99.013 ANEMIA DURING PREGNANCY IN THIRD TRIMESTER: ICD-10-CM

## 2019-02-28 DIAGNOSIS — O99.213 OBESITY COMPLICATING PREGNANCY IN THIRD TRIMESTER: ICD-10-CM

## 2019-02-28 DIAGNOSIS — O36.5930 IUGR (INTRAUTERINE GROWTH RESTRICTION) AFFECTING CARE OF MOTHER, THIRD TRIMESTER, NOT APPLICABLE OR UNSPECIFIED FETUS: ICD-10-CM

## 2019-02-28 DIAGNOSIS — Z3A.29 29 WEEKS GESTATION OF PREGNANCY: ICD-10-CM

## 2019-03-01 ENCOUNTER — RESULTS ENCOUNTER (OUTPATIENT)
Dept: OBSTETRICS AND GYNECOLOGY | Facility: CLINIC | Age: 29
End: 2019-03-01

## 2019-03-01 DIAGNOSIS — O36.5930 IUGR (INTRAUTERINE GROWTH RESTRICTION) AFFECTING CARE OF MOTHER, THIRD TRIMESTER, NOT APPLICABLE OR UNSPECIFIED FETUS: ICD-10-CM

## 2019-03-05 ENCOUNTER — ROUTINE PRENATAL (OUTPATIENT)
Dept: OBSTETRICS AND GYNECOLOGY | Facility: CLINIC | Age: 29
End: 2019-03-05

## 2019-03-05 VITALS — BODY MASS INDEX: 33.84 KG/M2 | SYSTOLIC BLOOD PRESSURE: 102 MMHG | DIASTOLIC BLOOD PRESSURE: 61 MMHG | WEIGHT: 185 LBS

## 2019-03-05 DIAGNOSIS — O36.5930 IUGR (INTRAUTERINE GROWTH RESTRICTION) AFFECTING CARE OF MOTHER, THIRD TRIMESTER, NOT APPLICABLE OR UNSPECIFIED FETUS: ICD-10-CM

## 2019-03-05 DIAGNOSIS — IMO0002 POOR FETAL GROWTH: ICD-10-CM

## 2019-03-05 DIAGNOSIS — K58.0 IRRITABLE BOWEL SYNDROME WITH DIARRHEA: ICD-10-CM

## 2019-03-05 DIAGNOSIS — Z3A.31 31 WEEKS GESTATION OF PREGNANCY: Primary | ICD-10-CM

## 2019-03-05 DIAGNOSIS — O99.213 OBESITY COMPLICATING PREGNANCY IN THIRD TRIMESTER: ICD-10-CM

## 2019-03-05 DIAGNOSIS — O99.013 ANEMIA DURING PREGNANCY IN THIRD TRIMESTER: ICD-10-CM

## 2019-03-05 PROCEDURE — 99213 OFFICE O/P EST LOW 20 MIN: CPT | Performed by: OBSTETRICS & GYNECOLOGY

## 2019-03-08 ENCOUNTER — RESULTS ENCOUNTER (OUTPATIENT)
Dept: OBSTETRICS AND GYNECOLOGY | Facility: CLINIC | Age: 29
End: 2019-03-08

## 2019-03-08 DIAGNOSIS — O36.5930 IUGR (INTRAUTERINE GROWTH RESTRICTION) AFFECTING CARE OF MOTHER, THIRD TRIMESTER, NOT APPLICABLE OR UNSPECIFIED FETUS: ICD-10-CM

## 2019-03-08 DIAGNOSIS — IMO0002 POOR FETAL GROWTH: ICD-10-CM

## 2019-03-08 DIAGNOSIS — O36.5920: ICD-10-CM

## 2019-03-08 NOTE — PROGRESS NOTES
Chief Complaint   Patient presents with   • High Risk Gestation     Renetta Cote is a 28 y.o. year old .      27 yo  c/w 10w sono   MELISA May 2, 2019 by LMP.  Dating ultrasound is within 4 days with MELISA  May 6, 2019.     - A+, RI, HIV neg, RPR NR, HBsAg neg, GC/CT neg/neg  - Discussed genetic and carrier screening; declined both at this time.       Having a girl named Yamileth Fernandes.     20 week ultrasound showed no abnormalities but was limited.     2019 at 23 weeks and 4 days ultrasound shows single intrauterine pregnancy in the vertex position with again some of the anatomic features limited by the fetal position.  Cervical length 4.51 cm.  Recommend repeat ultrasound in 3 weeks.     2019, maternal blood type A positive.  One hour Glucola and CBC in 3 weeks with follow-up ultrasound.     2019 single live intrauterine pregnancy at 26 weeks and 5 days.  Measurements are 1 week and 2 days behind stated gestational age.  Previously is suboptimal anatomy is visualized and appears normal with suboptimal views of the lumbar and sacral spine.  Normal amniotic fluid.  Normal cervical length.  Reassuring biophysical profile of 8 out of 8.  Umbilical and uterine artery Doppler changes consistent with an increased risk for IUGR, preeclampsia, stillbirth.  Recommend ultrasounds in 2 weeks for growth, biophysical profile, and Dopplers.     CBC and one-hour Glucola performed 2019.  One hour glucola 127.  Hg.4.  Hct 31.1.  Platelets 264,000.  She was placed on iron therapy.     2019 ultrasound shows single intrauterine pregnancy in the cephalic position fetal heart rate 150 bpm.  NURYS is normal at 13.16 cm.  Biophysical profile 6 out of 8.  Reactive NST making biophysical profile 8 out of 10.  Estimated fetal weight 2 pounds 7 ounces or 9.5 percentile.  Umbilical artery S/D ratio 2.9 and 2.6.     2019, ultrasound shows single intrauterine  pregnancy in the cephalic position with fetal heart rate 129 bpm.  NURYS is normal at 19.48 cm.  Biophysical profile 8 out of 8.  Estimated fetal weight 2 pounds 12 ounces or 10th percentile.  Continue weekly biophysical profiles with Dopplers.     2019 ultrasound shows single intrauterine pregnancy in the cephalic position with fetal heart rate 136 bpm.  NURYS 14.79 cm.  Biophysical profile 8 out of 8.  Biophysical profile and Dopplers and ultrasound for growth in 1 week.     2019, ultrasound shows single intrauterine pregnancy in the cephalic position with fetal heart rate 117 bpm.  NURYS normal at 13.51 cm.  Biophysical profile 8 out of 8.  Estimated fetal weight 1695 g or 3 pounds 12 ounces or 25th percentile.  Normal Dopplers.  Plan weekly BPP's.    Obstetric History  #: 1, Date: 04/01/10, Sex: Female, Weight: 2551 g (5 lb 10 oz), GA: 38w0d, Delivery: Vaginal, Spontaneous, Apgar1: None, Apgar5: None, Living: Living, Birth Comments: jose f    #: 2, Date: None, Sex: None, Weight: None, GA: None, Delivery: None, Apgar1: None, Apgar5: None, Living: None, Birth Comments: None      The patient complains of the following: No new complaints    ROS  Headache: No   Visual changes: No   Swelling in legs: No   Nausea: No   Constipation: No   Diarrhea: No   Contractions: No   Leaking fluid: No   Vaginal bleeding: No   Other:      Specific topics discussed at today's visit: fetal kick counts and  labor precautions  Tests done today: BPP - 8  8 and growth scan  Tests to be done at the next visit: RAMON Jackson was seen today for high risk gestation.    Diagnoses and all orders for this visit:    31 weeks gestation of pregnancy    IUGR (intrauterine growth restriction) affecting care of mother, third trimester, not applicable or unspecified fetus    Anemia during pregnancy in third trimester    Irritable bowel syndrome with diarrhea    Obesity complicating pregnancy in third trimester    Poor fetal  growth  -     US Fetal Biophysical Profile;Without Non-Stress Testing; Standing

## 2019-03-12 ENCOUNTER — ROUTINE PRENATAL (OUTPATIENT)
Dept: OBSTETRICS AND GYNECOLOGY | Facility: CLINIC | Age: 29
End: 2019-03-12

## 2019-03-12 VITALS — BODY MASS INDEX: 34.02 KG/M2 | DIASTOLIC BLOOD PRESSURE: 60 MMHG | SYSTOLIC BLOOD PRESSURE: 97 MMHG | WEIGHT: 186 LBS

## 2019-03-12 DIAGNOSIS — Z3A.32 32 WEEKS GESTATION OF PREGNANCY: Primary | ICD-10-CM

## 2019-03-12 DIAGNOSIS — O99.213 MATERNAL OBESITY, ANTEPARTUM, THIRD TRIMESTER: ICD-10-CM

## 2019-03-12 DIAGNOSIS — Z36.4 ENCOUNTER FOR ANTENATAL SCREENING FOR FETAL GROWTH RETARDATION: ICD-10-CM

## 2019-03-12 DIAGNOSIS — IMO0002 POOR FETAL GROWTH: ICD-10-CM

## 2019-03-12 DIAGNOSIS — Z03.79 ENCOUNTER FOR OTHER SUSPECTED MATERNAL AND FETAL CONDITIONS RULED OUT: Primary | ICD-10-CM

## 2019-03-12 PROCEDURE — 99213 OFFICE O/P EST LOW 20 MIN: CPT | Performed by: NURSE PRACTITIONER

## 2019-03-12 NOTE — PROGRESS NOTES
CC: MEI appt hx reviewed    HPI: Second pregnancy, IUGR EFW 3lbs 12 oz 25%tile BPP 8/8, on 19 scan.      #: 1, Date: 04/01/10, Sex: Female, Weight: 2551 g (5 lb 10 oz), GA: 38w0d, Delivery: Vaginal, Spontaneous, Apgar1: None, Apgar5: None, Living: Living, Birth Comments: stadol    ROS: Denies, headaches, vb, cramping/ctx, or dysuria.    P/E: see vitals  Scan today: cephalic presentation, placenta posterior, NURYS 17.62cm, BPP 8/8, right artery s/d ratio 1.71, left artery s/d ratio 2.22.      A/P: 29 yo  @ 32w5 by LMP  1. OB visit  -Continue PNV  -PTL precautions  -RTC in 1 week for OB appt and BPP    2. Anemia  -continue taking ferrous sulfate daily    3. Poor fetal growth  -BPP weekly

## 2019-03-15 ENCOUNTER — RESULTS ENCOUNTER (OUTPATIENT)
Dept: OBSTETRICS AND GYNECOLOGY | Facility: CLINIC | Age: 29
End: 2019-03-15

## 2019-03-15 DIAGNOSIS — IMO0002 POOR FETAL GROWTH: ICD-10-CM

## 2019-03-15 DIAGNOSIS — O36.5930 IUGR (INTRAUTERINE GROWTH RESTRICTION) AFFECTING CARE OF MOTHER, THIRD TRIMESTER, NOT APPLICABLE OR UNSPECIFIED FETUS: ICD-10-CM

## 2019-03-18 DIAGNOSIS — Z03.79 ENCOUNTER FOR OTHER SUSPECTED MATERNAL AND FETAL CONDITIONS RULED OUT: ICD-10-CM

## 2019-03-19 ENCOUNTER — ROUTINE PRENATAL (OUTPATIENT)
Dept: OBSTETRICS AND GYNECOLOGY | Facility: CLINIC | Age: 29
End: 2019-03-19

## 2019-03-19 VITALS — BODY MASS INDEX: 34.2 KG/M2 | WEIGHT: 187 LBS | SYSTOLIC BLOOD PRESSURE: 99 MMHG | DIASTOLIC BLOOD PRESSURE: 58 MMHG

## 2019-03-19 DIAGNOSIS — IMO0002 POOR FETAL GROWTH: ICD-10-CM

## 2019-03-19 DIAGNOSIS — O99.013 ANEMIA DURING PREGNANCY IN THIRD TRIMESTER: ICD-10-CM

## 2019-03-19 DIAGNOSIS — Z3A.33 33 WEEKS GESTATION OF PREGNANCY: Primary | ICD-10-CM

## 2019-03-19 DIAGNOSIS — O99.213 MATERNAL OBESITY, ANTEPARTUM, THIRD TRIMESTER: ICD-10-CM

## 2019-03-19 DIAGNOSIS — O36.5930 IUGR (INTRAUTERINE GROWTH RESTRICTION) AFFECTING CARE OF MOTHER, THIRD TRIMESTER, NOT APPLICABLE OR UNSPECIFIED FETUS: ICD-10-CM

## 2019-03-19 PROCEDURE — 99213 OFFICE O/P EST LOW 20 MIN: CPT | Performed by: OBSTETRICS & GYNECOLOGY

## 2019-03-19 NOTE — PROGRESS NOTES
Chief Complaint   Patient presents with   • High Risk Gestation     Renetta Cote is a 28 y.o. year old .      27 yo  c/w 10w sono   MELISA May 2, 2019 by LMP.  Dating ultrasound is within 4 days with MELISA  May 6, 2019.     - A+, RI, HIV neg, RPR NR, HBsAg neg, GC/CT neg/neg  - Discussed genetic and carrier screening; declined both at this time.       Having a girl named Yamileth Fernandes.     20 week ultrasound showed no abnormalities but was limited.     2019 at 23 weeks and 4 days ultrasound shows single intrauterine pregnancy in the vertex position with again some of the anatomic features limited by the fetal position.  Cervical length 4.51 cm.  Recommend repeat ultrasound in 3 weeks.     2019, maternal blood type A positive.  One hour Glucola and CBC in 3 weeks with follow-up ultrasound.     2019 single live intrauterine pregnancy at 26 weeks and 5 days.  Measurements are 1 week and 2 days behind stated gestational age.  Previously is suboptimal anatomy is visualized and appears normal with suboptimal views of the lumbar and sacral spine.  Normal amniotic fluid.  Normal cervical length.  Reassuring biophysical profile of 8 out of 8.  Umbilical and uterine artery Doppler changes consistent with an increased risk for IUGR, preeclampsia, stillbirth.  Recommend ultrasounds in 2 weeks for growth, biophysical profile, and Dopplers.     CBC and one-hour Glucola performed 2019.  One hour glucola 127.  Hg.4.  Hct 31.1.  Platelets 264,000.  She was placed on iron therapy.     2019 ultrasound shows single intrauterine pregnancy in the cephalic position fetal heart rate 150 bpm.  NURYS is normal at 13.16 cm.  Biophysical profile 6 out of 8.  Reactive NST making biophysical profile 8 out of 10.  Estimated fetal weight 2 pounds 7 ounces or 9.5 percentile.  Umbilical artery S/D ratio 2.9 and 2.6.     2019, ultrasound shows single intrauterine  pregnancy in the cephalic position with fetal heart rate 129 bpm.  NURYS is normal at 19.48 cm.  Biophysical profile 8 out of 8.  Estimated fetal weight 2 pounds 12 ounces or 10th percentile.  Continue weekly biophysical profiles with Dopplers.     2019 ultrasound shows single intrauterine pregnancy in the cephalic position with fetal heart rate 136 bpm.  NURYS 14.79 cm.  Biophysical profile 8 out of 8.  Biophysical profile and Dopplers and ultrasound for growth in 1 week.     2019, ultrasound shows single intrauterine pregnancy in the cephalic position with fetal heart rate 117 bpm.  NURYS normal at 13.51 cm.  Biophysical profile 8 out of 8.  Estimated fetal weight 1695 g or 3 pounds 12 ounces or 25th percentile.  Normal Dopplers.  Plan weekly BPP's.    2019, 33 weeks and 5 days, ultrasound shows single intrauterine pregnancy in the cephalic position with fetal heart rate 126 bpm.  NURYS normal at 15.49 cm.  Biophysical profile 8 out of 8.    Obstetric History  #: 1, Date: 04/01/10, Sex: Female, Weight: 2551 g (5 lb 10 oz), GA: 38w0d, Delivery: Vaginal, Spontaneous, Apgar1: None, Apgar5: None, Living: Living, Birth Comments: jose f    #: 2, Date: None, Sex: None, Weight: None, GA: None, Delivery: None, Apgar1: None, Apgar5: None, Living: None, Birth Comments: None      The patient complains of the following: No new complaints    ROS  Headache: No   Visual changes: No   Swelling in legs: No   Nausea: No   Constipation: No   Diarrhea: No   Contractions: No   Leaking fluid: No   Vaginal bleeding: No   Other:      Specific topics discussed at today's visit: fetal kick counts and  labor precautions  Tests done today: BPP - 8 / 8  Tests to be done at the next visit: BPP and GBS swab    Renetta was seen today for high risk gestation.    Diagnoses and all orders for this visit:    33 weeks gestation of pregnancy    IUGR (intrauterine growth restriction) affecting care of mother, third  trimester, not applicable or unspecified fetus    Poor fetal growth  -     US Ob Follow Up Transabdominal Approach; Future    Anemia during pregnancy in third trimester    Maternal obesity, antepartum, third trimester

## 2019-03-22 ENCOUNTER — RESULTS ENCOUNTER (OUTPATIENT)
Dept: OBSTETRICS AND GYNECOLOGY | Facility: CLINIC | Age: 29
End: 2019-03-22

## 2019-03-22 DIAGNOSIS — IMO0002 POOR FETAL GROWTH: ICD-10-CM

## 2019-03-22 DIAGNOSIS — O36.5930 IUGR (INTRAUTERINE GROWTH RESTRICTION) AFFECTING CARE OF MOTHER, THIRD TRIMESTER, NOT APPLICABLE OR UNSPECIFIED FETUS: ICD-10-CM

## 2019-03-22 DIAGNOSIS — O36.5920: ICD-10-CM

## 2019-03-25 DIAGNOSIS — IMO0002 POOR FETAL GROWTH: ICD-10-CM

## 2019-03-26 ENCOUNTER — ROUTINE PRENATAL (OUTPATIENT)
Dept: OBSTETRICS AND GYNECOLOGY | Facility: CLINIC | Age: 29
End: 2019-03-26

## 2019-03-26 VITALS — BODY MASS INDEX: 34.39 KG/M2 | DIASTOLIC BLOOD PRESSURE: 64 MMHG | SYSTOLIC BLOOD PRESSURE: 108 MMHG | WEIGHT: 188 LBS

## 2019-03-26 DIAGNOSIS — IMO0002 POOR FETAL GROWTH: ICD-10-CM

## 2019-03-26 DIAGNOSIS — O99.013 ANEMIA DURING PREGNANCY IN THIRD TRIMESTER: ICD-10-CM

## 2019-03-26 DIAGNOSIS — Z3A.34 34 WEEKS GESTATION OF PREGNANCY: Primary | ICD-10-CM

## 2019-03-26 DIAGNOSIS — O99.213 OBESITY IN PREGNANCY, ANTEPARTUM, THIRD TRIMESTER: ICD-10-CM

## 2019-03-26 PROCEDURE — 99213 OFFICE O/P EST LOW 20 MIN: CPT | Performed by: OBSTETRICS & GYNECOLOGY

## 2019-03-26 PROCEDURE — 87653 STREP B DNA AMP PROBE: CPT | Performed by: OBSTETRICS & GYNECOLOGY

## 2019-03-27 LAB — GROUP B STREP, DNA: NEGATIVE

## 2019-03-29 ENCOUNTER — RESULTS ENCOUNTER (OUTPATIENT)
Dept: OBSTETRICS AND GYNECOLOGY | Facility: CLINIC | Age: 29
End: 2019-03-29

## 2019-03-29 DIAGNOSIS — IMO0002 POOR FETAL GROWTH: ICD-10-CM

## 2019-03-29 DIAGNOSIS — O36.5930 IUGR (INTRAUTERINE GROWTH RESTRICTION) AFFECTING CARE OF MOTHER, THIRD TRIMESTER, NOT APPLICABLE OR UNSPECIFIED FETUS: ICD-10-CM

## 2019-03-29 DIAGNOSIS — O99.213 OBESITY IN PREGNANCY, ANTEPARTUM, THIRD TRIMESTER: ICD-10-CM

## 2019-04-01 DIAGNOSIS — IMO0002 POOR FETAL GROWTH: ICD-10-CM

## 2019-04-02 ENCOUNTER — ROUTINE PRENATAL (OUTPATIENT)
Dept: OBSTETRICS AND GYNECOLOGY | Facility: CLINIC | Age: 29
End: 2019-04-02

## 2019-04-02 VITALS — SYSTOLIC BLOOD PRESSURE: 96 MMHG | DIASTOLIC BLOOD PRESSURE: 58 MMHG | WEIGHT: 188.6 LBS | BODY MASS INDEX: 34.5 KG/M2

## 2019-04-02 DIAGNOSIS — O99.213 OBESITY IN PREGNANCY, ANTEPARTUM, THIRD TRIMESTER: ICD-10-CM

## 2019-04-02 DIAGNOSIS — O36.5920: ICD-10-CM

## 2019-04-02 DIAGNOSIS — O99.013 ANEMIA DURING PREGNANCY IN THIRD TRIMESTER: ICD-10-CM

## 2019-04-02 DIAGNOSIS — O36.5930 IUGR (INTRAUTERINE GROWTH RESTRICTION) AFFECTING CARE OF MOTHER, THIRD TRIMESTER, NOT APPLICABLE OR UNSPECIFIED FETUS: Primary | ICD-10-CM

## 2019-04-02 DIAGNOSIS — R09.81 NASAL CONGESTION: ICD-10-CM

## 2019-04-02 PROCEDURE — 90715 TDAP VACCINE 7 YRS/> IM: CPT | Performed by: ADVANCED PRACTICE MIDWIFE

## 2019-04-02 PROCEDURE — 99213 OFFICE O/P EST LOW 20 MIN: CPT | Performed by: ADVANCED PRACTICE MIDWIFE

## 2019-04-02 PROCEDURE — 90471 IMMUNIZATION ADMIN: CPT | Performed by: ADVANCED PRACTICE MIDWIFE

## 2019-04-02 NOTE — PROGRESS NOTES
CC: MEI visit, history reviewed, no changes noted    ROS:Positive no complaints   Negative leaking fluid from the vagina, swelling in her legs, headache, visual changes, low back pain and heartburn      Educated on:US resultsm FKC, VB, PTL    A/Plan: f/u in 1 week/s   Renetta was seen today for routine prenatal visit.    Diagnoses and all orders for this visit:    IUGR (intrauterine growth restriction) affecting care of mother, third trimester, not applicable or unspecified fetus    Obesity in pregnancy, antepartum, third trimester    Anemia during pregnancy in third trimester    Poor fetal growth affecting pregnancy in second trimester, single or unspecified fetus    Nasal congestion    Other orders  -     Tdap Vaccine Greater Than or Equal To 6yo IM

## 2019-04-04 DIAGNOSIS — O99.213 OBESITY IN PREGNANCY, ANTEPARTUM, THIRD TRIMESTER: ICD-10-CM

## 2019-04-04 DIAGNOSIS — IMO0002 POOR FETAL GROWTH: ICD-10-CM

## 2019-04-05 ENCOUNTER — RESULTS ENCOUNTER (OUTPATIENT)
Dept: OBSTETRICS AND GYNECOLOGY | Facility: CLINIC | Age: 29
End: 2019-04-05

## 2019-04-05 DIAGNOSIS — IMO0002 POOR FETAL GROWTH: ICD-10-CM

## 2019-04-05 DIAGNOSIS — O36.5920: ICD-10-CM

## 2019-04-05 DIAGNOSIS — O36.5930 IUGR (INTRAUTERINE GROWTH RESTRICTION) AFFECTING CARE OF MOTHER, THIRD TRIMESTER, NOT APPLICABLE OR UNSPECIFIED FETUS: ICD-10-CM

## 2019-04-09 ENCOUNTER — ROUTINE PRENATAL (OUTPATIENT)
Dept: OBSTETRICS AND GYNECOLOGY | Facility: CLINIC | Age: 29
End: 2019-04-09

## 2019-04-09 VITALS — BODY MASS INDEX: 34.39 KG/M2 | SYSTOLIC BLOOD PRESSURE: 102 MMHG | DIASTOLIC BLOOD PRESSURE: 63 MMHG | WEIGHT: 188 LBS

## 2019-04-09 DIAGNOSIS — O99.013 ANEMIA DURING PREGNANCY IN THIRD TRIMESTER: ICD-10-CM

## 2019-04-09 DIAGNOSIS — Z3A.36 36 WEEKS GESTATION OF PREGNANCY: Primary | ICD-10-CM

## 2019-04-09 DIAGNOSIS — O99.213 OBESITY IN PREGNANCY, ANTEPARTUM, THIRD TRIMESTER: ICD-10-CM

## 2019-04-09 DIAGNOSIS — O36.5930 POOR FETAL GROWTH AFFECTING MANAGEMENT OF MOTHER IN THIRD TRIMESTER, SINGLE OR UNSPECIFIED FETUS: ICD-10-CM

## 2019-04-09 PROCEDURE — 99213 OFFICE O/P EST LOW 20 MIN: CPT | Performed by: NURSE PRACTITIONER

## 2019-04-09 NOTE — PROGRESS NOTES
CC: MEI visit, hx reviewed    ROS: Occasional BH ctx.  Denies headaches, visual disturbances, cramping, low back pain, dysuria, vaginal bleeding, or constipation.      P/E: see vitals  BPP today:     A/P:  @ 36w5d     1. MEI care  -PTL precautions  -Educated on FKCs  -GBS negative  -RTC in 1 week for MEI appt and US    2. IUGR  -weekly BPP and growth every 3 weeks    3. Anemia  -continue po iron

## 2019-04-12 ENCOUNTER — RESULTS ENCOUNTER (OUTPATIENT)
Dept: OBSTETRICS AND GYNECOLOGY | Facility: CLINIC | Age: 29
End: 2019-04-12

## 2019-04-12 DIAGNOSIS — O36.5930 IUGR (INTRAUTERINE GROWTH RESTRICTION) AFFECTING CARE OF MOTHER, THIRD TRIMESTER, NOT APPLICABLE OR UNSPECIFIED FETUS: ICD-10-CM

## 2019-04-12 DIAGNOSIS — O99.213 OBESITY IN PREGNANCY, ANTEPARTUM, THIRD TRIMESTER: ICD-10-CM

## 2019-04-12 DIAGNOSIS — IMO0002 POOR FETAL GROWTH: ICD-10-CM

## 2019-04-16 ENCOUNTER — ROUTINE PRENATAL (OUTPATIENT)
Dept: OBSTETRICS AND GYNECOLOGY | Facility: CLINIC | Age: 29
End: 2019-04-16

## 2019-04-16 VITALS — SYSTOLIC BLOOD PRESSURE: 108 MMHG | WEIGHT: 188 LBS | BODY MASS INDEX: 34.39 KG/M2 | DIASTOLIC BLOOD PRESSURE: 68 MMHG

## 2019-04-16 DIAGNOSIS — Z3A.37 37 WEEKS GESTATION OF PREGNANCY: Primary | ICD-10-CM

## 2019-04-16 DIAGNOSIS — O36.5930 POOR FETAL GROWTH AFFECTING MANAGEMENT OF MOTHER IN THIRD TRIMESTER, SINGLE OR UNSPECIFIED FETUS: ICD-10-CM

## 2019-04-16 DIAGNOSIS — O99.013 ANEMIA DURING PREGNANCY IN THIRD TRIMESTER: ICD-10-CM

## 2019-04-16 PROCEDURE — 99213 OFFICE O/P EST LOW 20 MIN: CPT | Performed by: NURSE PRACTITIONER

## 2019-04-16 NOTE — PROGRESS NOTES
CC: MEI visit, high risk pregnancy r/t IUGR    ROS: Occasional ctx, nothing regular, requests cervical check today.  Denies dysuria or vb.      P/E: see vitals, BPP  reviewed doppler studies awaiting final report    A/P:  @ 37w5d     1. MEI care  -Educated on true labor vs. False labor  -FKCs educated  -RTC in 1 week for MEI appt and us    2. IUGR   -weekly BPP and growth every 3 weeks     3. Anemia  -continue PO iron

## 2019-04-19 ENCOUNTER — RESULTS ENCOUNTER (OUTPATIENT)
Dept: OBSTETRICS AND GYNECOLOGY | Facility: CLINIC | Age: 29
End: 2019-04-19

## 2019-04-19 DIAGNOSIS — O36.5930 IUGR (INTRAUTERINE GROWTH RESTRICTION) AFFECTING CARE OF MOTHER, THIRD TRIMESTER, NOT APPLICABLE OR UNSPECIFIED FETUS: ICD-10-CM

## 2019-04-19 DIAGNOSIS — O36.5920: ICD-10-CM

## 2019-04-19 DIAGNOSIS — IMO0002 POOR FETAL GROWTH: ICD-10-CM

## 2019-04-19 DIAGNOSIS — O99.213 OBESITY IN PREGNANCY, ANTEPARTUM, THIRD TRIMESTER: ICD-10-CM

## 2019-04-21 DIAGNOSIS — Z3A.38 38 WEEKS GESTATION OF PREGNANCY: ICD-10-CM

## 2019-04-21 DIAGNOSIS — O99.213 MATERNAL OBESITY, ANTEPARTUM, THIRD TRIMESTER: Primary | ICD-10-CM

## 2019-04-22 ENCOUNTER — ROUTINE PRENATAL (OUTPATIENT)
Dept: OBSTETRICS AND GYNECOLOGY | Facility: CLINIC | Age: 29
End: 2019-04-22

## 2019-04-22 VITALS — BODY MASS INDEX: 34.46 KG/M2 | WEIGHT: 188.4 LBS | SYSTOLIC BLOOD PRESSURE: 112 MMHG | DIASTOLIC BLOOD PRESSURE: 71 MMHG

## 2019-04-22 DIAGNOSIS — Z3A.38 38 WEEKS GESTATION OF PREGNANCY: Primary | ICD-10-CM

## 2019-04-22 DIAGNOSIS — IMO0002 POOR FETAL GROWTH: ICD-10-CM

## 2019-04-22 PROCEDURE — 99213 OFFICE O/P EST LOW 20 MIN: CPT | Performed by: OBSTETRICS & GYNECOLOGY

## 2019-04-23 NOTE — PROGRESS NOTES
Compliant here for prenatal care    This is the first time I seen patient.  Diagnosis of IUGR I have looked through prior studies that are filed in epic and I do not think meet specific criteria.  I have communicated with Dr. Lombardi.  I think being conservative he is recommending treating as uncomplicated IUGR delivery 38 to 40 weeks I have discussed with the patient as she had is a relatively favorable cervix the advantage of delivering at 39 weeks and the considerable clinical evidence of the advantages of this.  However she wants to wait spontaneous onset of labor and after discussion with Dr. Lombardi work on plan for an NST this Thursday and if undelivered by next Monday repeat biophysical profile and go from there and clinical situation risks benefits alternatives reviewed at length with patient uncertainties of present medical understanding made clear

## 2019-04-24 DIAGNOSIS — IMO0002 POOR FETAL GROWTH: ICD-10-CM

## 2019-04-24 DIAGNOSIS — O99.213 OBESITY IN PREGNANCY, ANTEPARTUM, THIRD TRIMESTER: ICD-10-CM

## 2019-04-25 ENCOUNTER — ROUTINE PRENATAL (OUTPATIENT)
Dept: OBSTETRICS AND GYNECOLOGY | Facility: CLINIC | Age: 29
End: 2019-04-25

## 2019-04-25 VITALS — BODY MASS INDEX: 34.86 KG/M2 | DIASTOLIC BLOOD PRESSURE: 68 MMHG | WEIGHT: 190.6 LBS | SYSTOLIC BLOOD PRESSURE: 106 MMHG

## 2019-04-25 DIAGNOSIS — IMO0002 POOR FETAL GROWTH: ICD-10-CM

## 2019-04-25 DIAGNOSIS — O99.213 OBESITY IN PREGNANCY, ANTEPARTUM, THIRD TRIMESTER: ICD-10-CM

## 2019-04-25 DIAGNOSIS — Z3A.39 39 WEEKS GESTATION OF PREGNANCY: ICD-10-CM

## 2019-04-25 DIAGNOSIS — O36.5921 IUGR (INTRAUTERINE GROWTH RESTRICTION) AFFECTING CARE OF MOTHER, SECOND TRIMESTER, FETUS 1: Primary | ICD-10-CM

## 2019-04-25 PROCEDURE — 99213 OFFICE O/P EST LOW 20 MIN: CPT | Performed by: OBSTETRICS & GYNECOLOGY

## 2019-04-25 PROCEDURE — 59025 FETAL NON-STRESS TEST: CPT | Performed by: OBSTETRICS & GYNECOLOGY

## 2019-04-26 ENCOUNTER — RESULTS ENCOUNTER (OUTPATIENT)
Dept: OBSTETRICS AND GYNECOLOGY | Facility: CLINIC | Age: 29
End: 2019-04-26

## 2019-04-26 DIAGNOSIS — IMO0002 POOR FETAL GROWTH: ICD-10-CM

## 2019-04-28 PROBLEM — O36.5921 IUGR (INTRAUTERINE GROWTH RESTRICTION) AFFECTING CARE OF MOTHER, SECOND TRIMESTER, FETUS 1: Status: ACTIVE | Noted: 2019-04-28

## 2019-04-28 PROBLEM — Z3A.39 39 WEEKS GESTATION OF PREGNANCY: Status: ACTIVE | Noted: 2019-04-28

## 2019-04-28 NOTE — PROGRESS NOTES
Complaint here for prenatal care complicated by IUGR    Patient with IUGR client induction at 38 to 39 weeks wanting spontaneous onset of labor had reviewed with prenatal group [facility] you went over her records and felt this was reasonable with testing.  Nonstress test today reactive strict kick counts emphasized were going to see back on Monday

## 2019-04-29 ENCOUNTER — ROUTINE PRENATAL (OUTPATIENT)
Dept: OBSTETRICS AND GYNECOLOGY | Facility: CLINIC | Age: 29
End: 2019-04-29

## 2019-04-29 DIAGNOSIS — O36.5921 IUGR (INTRAUTERINE GROWTH RESTRICTION) AFFECTING CARE OF MOTHER, SECOND TRIMESTER, FETUS 1: Primary | ICD-10-CM

## 2019-04-29 DIAGNOSIS — Z3A.39 39 WEEKS GESTATION OF PREGNANCY: ICD-10-CM

## 2019-04-29 PROCEDURE — 99213 OFFICE O/P EST LOW 20 MIN: CPT | Performed by: OBSTETRICS & GYNECOLOGY

## 2019-04-30 VITALS — SYSTOLIC BLOOD PRESSURE: 112 MMHG | WEIGHT: 192 LBS | DIASTOLIC BLOOD PRESSURE: 70 MMHG | BODY MASS INDEX: 35.12 KG/M2

## 2019-04-30 RX ORDER — BUTORPHANOL TARTRATE 1 MG/ML
2 INJECTION, SOLUTION INTRAMUSCULAR; INTRAVENOUS
Status: CANCELLED | OUTPATIENT
Start: 2019-04-30

## 2019-04-30 RX ORDER — PROMETHAZINE HYDROCHLORIDE 25 MG/1
12.5 SUPPOSITORY RECTAL EVERY 4 HOURS PRN
Status: CANCELLED | OUTPATIENT
Start: 2019-04-30

## 2019-04-30 RX ORDER — METHYLERGONOVINE MALEATE 0.2 MG/ML
200 INJECTION INTRAVENOUS ONCE AS NEEDED
Status: CANCELLED | OUTPATIENT
Start: 2019-04-30

## 2019-04-30 RX ORDER — PROMETHAZINE HYDROCHLORIDE 25 MG/1
12.5 TABLET ORAL EVERY 4 HOURS PRN
Status: CANCELLED | OUTPATIENT
Start: 2019-04-30

## 2019-04-30 RX ORDER — LIDOCAINE HYDROCHLORIDE 10 MG/ML
5 INJECTION, SOLUTION EPIDURAL; INFILTRATION; INTRACAUDAL; PERINEURAL AS NEEDED
Status: CANCELLED | OUTPATIENT
Start: 2019-04-30

## 2019-04-30 RX ORDER — PROMETHAZINE HYDROCHLORIDE 25 MG/ML
12.5 INJECTION, SOLUTION INTRAMUSCULAR; INTRAVENOUS EVERY 4 HOURS PRN
Status: CANCELLED | OUTPATIENT
Start: 2019-04-30

## 2019-04-30 RX ORDER — SODIUM CHLORIDE 0.9 % (FLUSH) 0.9 %
3 SYRINGE (ML) INJECTION EVERY 12 HOURS SCHEDULED
Status: CANCELLED | OUTPATIENT
Start: 2019-04-30

## 2019-04-30 RX ORDER — CARBOPROST TROMETHAMINE 250 UG/ML
250 INJECTION, SOLUTION INTRAMUSCULAR AS NEEDED
Status: CANCELLED | OUTPATIENT
Start: 2019-04-30

## 2019-04-30 RX ORDER — SODIUM CHLORIDE 0.9 % (FLUSH) 0.9 %
3-10 SYRINGE (ML) INJECTION AS NEEDED
Status: CANCELLED | OUTPATIENT
Start: 2019-04-30

## 2019-04-30 RX ORDER — DEXTROSE, SODIUM CHLORIDE, SODIUM LACTATE, POTASSIUM CHLORIDE, AND CALCIUM CHLORIDE 5; .6; .31; .03; .02 G/100ML; G/100ML; G/100ML; G/100ML; G/100ML
125 INJECTION, SOLUTION INTRAVENOUS CONTINUOUS
Status: CANCELLED | OUTPATIENT
Start: 2019-04-30

## 2019-04-30 RX ORDER — MISOPROSTOL 100 UG/1
800 TABLET ORAL AS NEEDED
Status: CANCELLED | OUTPATIENT
Start: 2019-04-30

## 2019-04-30 RX ORDER — OXYTOCIN 10 [USP'U]/ML
650 INJECTION, SOLUTION INTRAMUSCULAR; INTRAVENOUS ONCE
Status: CANCELLED | OUTPATIENT
Start: 2019-04-30

## 2019-04-30 RX ORDER — OXYTOCIN 10 [USP'U]/ML
85 INJECTION, SOLUTION INTRAMUSCULAR; INTRAVENOUS ONCE
Status: CANCELLED | OUTPATIENT
Start: 2019-04-30

## 2019-04-30 RX ORDER — BUTORPHANOL TARTRATE 1 MG/ML
1 INJECTION, SOLUTION INTRAMUSCULAR; INTRAVENOUS
Status: CANCELLED | OUTPATIENT
Start: 2019-04-30

## 2019-04-30 RX ORDER — OXYTOCIN 10 [USP'U]/ML
2-20 INJECTION, SOLUTION INTRAMUSCULAR; INTRAVENOUS
Status: CANCELLED | OUTPATIENT
Start: 2019-04-30

## 2019-05-01 NOTE — H&P (VIEW-ONLY)
Obstetric History and Physical    Chief Complaint   Patient presents with   • Pregnancy Ultrasound   Chief complaint intrauterine growth restriction      Chief complaint intrauterine growth restriction patient is a 28 y.o. female  currently at 39w5d, who presents with complicated by intrauterine growth restriction wanted to avoid induction of labor.  Dr. Lombardi had reviewed her studies including Doppler studies felt followed to 40 weeks as she is now functioning 40 weeks.  Preparations are made for induction of labor actually cervix fairly favorable about 2 cm please see scheduling as sheet.  The risk of induction of labor are reviewed at length hypertonic contractions damage mother baby risk of iatrogenic  section and its risk. Both the short-term and long-term risks of  section are reviewed at length.  Short-term risks of bleeding, infection, problems with wound healing, damage to bowel, bladder or ureter.  Small, but real risk of maternal mortality is reviewed and understood.    Long-term risks include in future pregnancies accreta abruption, uterine rupture and stillbirth, among others might be ameliorated by a .  The patient and her family have been given opportunity to ask questions and these questions have been answered to their satisfaction..         Obstetric History   #: 1, Date: 04/01/10, Sex: Female, Weight: 2551 g (5 lb 10 oz), GA: 38w0d, Delivery: Vaginal, Spontaneous, Apgar1: None, Apgar5: None, Living: Living, Birth Comments: jose f    #: 2, Date: None, Sex: None, Weight: None, GA: None, Delivery: None, Apgar1: None, Apgar5: None, Living: None, Birth Comments: None       The following portions of the patients history were reviewed and updated as appropriate: current medications, allergies, past medical history, past surgical history, past family history, past social history and problem list .       Prenatal Information:  Prenatal Results     Initial Prenatal Labs      Test Value Reference Range Date Time    Hemoglobin 12.5 g/dL 12.0 - 15.5 g/dL 10/02/18 0913    Hematocrit 36.0 % 35.0 - 45.0 % 10/02/18 0913    Platelets 264 10*3/mm3 150 - 450 10*3/mm3 01/29/19 0943    Rubella IgG 106.0 IU/mL 0.0 - 9.9 IU/mL 10/02/18 0913      Immune  Immune 10/02/18 0913    Hepatitis B SAg Negative  Negative 10/02/18 0913    Hepatitis C Ab Negative  Negative 10/02/18 0913    RPR Non-Reactive  Non-Reactive 10/02/18 0913    ABO A   10/02/18 0913    Rh Positive   10/02/18 0913    Antibody Screen Negative   10/02/18 0913    HIV Negative  Negative 10/02/18 0913    Urine Culture        Gonorrhea Negative  Negative 10/02/18 0913    Chlamydia Negative  Negative 10/02/18 0913    TSH 2.630 mIU/mL 0.460 - 4.680 mIU/mL 10/16/17 1030          2nd and 3rd Trimester     Test Value Reference Range Date Time    Hemoglobin (repeated) 10.4 g/dL 12.0 - 15.5 g/dL 01/29/19 0943    Hematocrit (repeated) 31.1 % 35.0 - 45.0 % 01/29/19 0943     mg/dL 60 - 140 mg/dL 01/29/19 0943    Antibody Screen (repeated)        GTT Fasting        GTT 1 Hr        GTT 2 Hr        GTT 3 Hr        Group B Strep Negative  Negative 03/26/19 0952          Drug Screening     Test Value Reference Range Date Time    Amphetamine Screen Negative  Negative 10/02/18 0913    Barbiturate Screen Negative  Negative 10/02/18 0913    Benzodiazepine Screen Negative  Negative 10/02/18 0913    Methadone Screen Negative  Negative 10/02/18 0913    Phencyclidine Screen        Opiates Screen Negative  Negative 10/02/18 0913    THC Screen Negative  Negative 10/02/18 0913    Cocaine Screen Negative  Negative 10/02/18 0913    Propoxyphene Screen        Buprenorphine Screen        Methamphetamine Screen        Oxycodone Screen Negative  Negative 10/02/18 0913    Tricyclic Antidepressants Screen              Other (Risk screening)     Test Value Reference Range Date Time    Varicella IgG        Parvovirus IgG        CMV IgG        Cystic Fibrosis         Hemoglobin electrophoresis        NIPT        MSAFP-4        AFP (for NTD only)                  External Prenatal Results     Pregnancy Outside Results - Transcribed From Office Records - See Scanned Records For Details     Test Value Date Time    Hgb 10.4 g/dL 19 0943    Hct 31.1 % 19 0943    ABO A  10/02/18 0913    Rh Positive  10/02/18 0913    Antibody Screen Negative  10/02/18 0913    Glucose Fasting GTT       Glucose Tolerance Test 1 hour       Glucose Tolerance Test 3 hour       Gonorrhea (discrete) Negative  10/02/18 0913    Chlamydia (discrete) Negative  10/02/18 0913    RPR Non-Reactive  10/02/18 0913    VDRL       Syphilis Antibody       Rubella 106.0 IU/mL 10/02/18 0913      Immune  10/02/18 0913    HBsAg Negative  10/02/18 0913    Herpes Simplex Virus PCR       Herpes Simplex VIrus Culture       HIV Negative  10/02/18 0913    Hep C RNA Quant PCR       Hep C Antibody Negative  10/02/18 0913    AFP       Group B Strep Negative  19 0952    GBS Susceptibility to Clindamycin       GBS Susceptibility to Erythromycin       Fetal Fibronectin       Genetic Testing, Maternal Blood             Drug Screening     Test Value Date Time    Urine Drug Screen       Amphetamine Screen Negative  10/02/18 0913    Barbiturate Screen Negative  10/02/18 0913    Benzodiazepine Screen Negative  10/02/18 0913    Methadone Screen Negative  10/02/18 0913    Phencyclidine Screen       Opiates Screen Negative  10/02/18 0913    THC Screen Negative  10/02/18 0913    Cocaine Screen       Propoxyphene Screen       Buprenorphine Screen       Methamphetamine Screen       Oxycodone Screen Negative  10/02/18 09    Tricyclic Antidepressants Screen                    Past OB History:     Obstetric History       T1      L1     SAB0   TAB0   Ectopic0   Molar0   Multiple0   Live Births1       # Outcome Date GA Lbr Derrell/2nd Weight Sex Delivery Anes PTL Lv   2 Current            1 Term 04/01/10 38w0d  2551 g (5 lb  10 oz) F Vag-Spont None, OTHER N JASON      Name: Khloe           ALLERGIES:   No Known Allergies     Home Medications:     Prior to Admission medications    Medication Sig Start Date End Date Taking? Authorizing Provider   ferrous sulfate 325 (65 FE) MG tablet Take 1 tablet by mouth 2 (Two) Times a Day Before Meals. 2/8/19   Demetris Muñoz MD   Prenatal Vit-Fe Fumarate-FA (PRENATAL VITAMIN) 27-0.8 MG tablet Take 1 tablet by mouth Daily. 10/2/18   Syl Murphy, JEANNIE       Past Medical History: Past Medical History:   Diagnosis Date   • Anemia during pregnancy in third trimester 3/19/2019   • Anxiety    • Diarrhea    • Dysmenorrhea    • Dysphagia    • Encounter for gynecological examination without abnormal finding    • Encounter for initial prescription of contraceptive pills    • Encounter for screening for infections with predominantly sexual mode of transmission    • Endometriosis    • Excessive and frequent menstruation with regular cycle    • Generalized abdominal pain    • GERD (gastroesophageal reflux disease)    • Gestational hypertension    • Hypertension    • IBS (irritable bowel syndrome)    • Poor fetal growth 1/29/2019   • Secondary dysmenorrhea    • Stricture of esophagus    • Varicella    • Vitamin D deficiency     Deficiency of vitamin D2 - cont supp, may take 5000U daily         Past Surgical History Past Surgical History:   Procedure Laterality Date   • CHOLECYSTECTOMY      Laparoscopic cholecystectomy with operative cholangiogram. Right upper quadrant pain. 10/12/2006       • COLONOSCOPY  10/17/2011   • ENDOSCOPY      Colitis in terminal ileum. Biopsy taken. 10/17/2011       • ENDOSCOPY      Esophageal stricture was present. Dilatation was performed. Esophagitis seen. Biopsy taken. Gastritis was found in the stomach. Biopsy taken. Normal duodenum. 01/12/2015      • ENDOSCOPY      The stomach appeared mike. A single biopsy was obtained and placed on PyloriTek strip. Normal EGD (45.13) 01/05/2007        • EYE SURGERY      Bilateral medial rectus recession. Entropion. 10/31/1994       • OTHER SURGICAL HISTORY      TUBE IMPLANT GENERAL ANESTHETIC 97275 (1)    Bilateral myringototomy with tube insertions and adenoidectomy. 04/18/2001       • PELVIC LAPAROSCOPY  2000    dx endometriosis   • UPPER GASTROINTESTINAL ENDOSCOPY  01/12/2015      Family History: Family History   Problem Relation Age of Onset   • Diabetes Maternal Aunt    • Hypertension Maternal Aunt    • Heart disease Maternal Grandmother    • Hypertension Maternal Grandmother    • Hypertension Father    • Ovarian cancer Paternal Aunt       Social History:  reports that she quit smoking about 20 months ago. Her smoking use included cigarettes. She started smoking about 2 years ago. She has a 0.25 pack-year smoking history. She has never used smokeless tobacco.   reports that she does not drink alcohol.   reports that she does not use drugs.        Review of Systems                                                                                                                  Neuro no history of brain tumor    HENT no history of ear tumors    Eye no history of retinal tumors    Pulmonary no history of lung tumors    Cardiac no history of cardiac tumors    GI: No history of small bowel tumors    Musculoskeletal: No history of skeletal muscle tumors    Endocrine: No history of adrenal tumors    Lymphatic: No history of Hodgkin's disease    Renal: No history of renal cancer      Objective       Vital Signs Range for the last 24 hours  Temperature:     Temp Source:     BP:     Pulse:     Respirations:     SPO2:     O2 Amount (l/min):     O2 Devices     Weight:         OBGyn Exam  Constitutional: Appears to be in no acute distress; Eyes: sclera normal; Endocrine system: thyroid palpate is normal; Pulmonary system: lungs clear; Cardiovascular system: heart regular rate and rhythm; Gastrointestinal system: abdomen soft nontender, active bowel sounds; Urologic  system: CVA negative; Psychiatric: appropriate insight; Neurologic: gait within normal limits            * No active hospital problems. *      Assessment/Plan:  1. 28 y.o. [id number].  Pregnancy complicated by IUGR without other complications.  Wanted to avoid induction of labor.  Reviewed perinatology felt could wait to 40 weeks now approaching 40 weeks we will plan induction about 40 weeks risk benefits alternatives reviewed at length                This document has been electronically signed by Milan Chew MD on 2019 10:53 PM

## 2019-05-02 ENCOUNTER — HOSPITAL ENCOUNTER (INPATIENT)
Dept: LABOR AND DELIVERY | Facility: HOSPITAL | Age: 29
Discharge: HOME | End: 2019-05-02

## 2019-05-02 ENCOUNTER — HOSPITAL ENCOUNTER (INPATIENT)
Facility: HOSPITAL | Age: 29
LOS: 1 days | Discharge: HOME | End: 2019-05-03
Attending: OBSTETRICS & GYNECOLOGY | Admitting: OBSTETRICS & GYNECOLOGY

## 2019-05-02 DIAGNOSIS — O36.5921 IUGR (INTRAUTERINE GROWTH RESTRICTION) AFFECTING CARE OF MOTHER, SECOND TRIMESTER, FETUS 1: ICD-10-CM

## 2019-05-02 PROBLEM — O36.5990 INTRAUTERINE GROWTH RESTRICTION (IUGR) AFFECTING CARE OF MOTHER: Status: ACTIVE | Noted: 2019-05-02

## 2019-05-02 LAB
ABO GROUP BLD: NORMAL
AMPHET+METHAMPHET UR QL: NEGATIVE
BARBITURATES UR QL SCN: NEGATIVE
BENZODIAZ UR QL SCN: NEGATIVE
BLD GP AB SCN SERPL QL: NEGATIVE
CANNABINOIDS SERPL QL: NEGATIVE
COCAINE UR QL: NEGATIVE
DEPRECATED RDW RBC AUTO: 41.9 FL (ref 37–54)
ERYTHROCYTE [DISTWIDTH] IN BLOOD BY AUTOMATED COUNT: 14.5 % (ref 12.3–15.4)
HCT VFR BLD AUTO: 28.4 % (ref 34–46.6)
HGB BLD-MCNC: 9 G/DL (ref 12–15.9)
Lab: NORMAL
MCH RBC QN AUTO: 25.4 PG (ref 26.6–33)
MCHC RBC AUTO-ENTMCNC: 31.7 G/DL (ref 31.5–35.7)
MCV RBC AUTO: 80 FL (ref 79–97)
METHADONE UR QL SCN: NEGATIVE
OPIATES UR QL: NEGATIVE
OXYCODONE UR QL SCN: NEGATIVE
PLATELET # BLD AUTO: 242 10*3/MM3 (ref 140–450)
PMV BLD AUTO: 9.8 FL (ref 6–12)
RBC # BLD AUTO: 3.55 10*6/MM3 (ref 3.77–5.28)
RH BLD: POSITIVE
T&S EXPIRATION DATE: NORMAL
WBC NRBC COR # BLD: 10.07 10*3/MM3 (ref 3.4–10.8)

## 2019-05-02 PROCEDURE — 80307 DRUG TEST PRSMV CHEM ANLYZR: CPT | Performed by: OBSTETRICS & GYNECOLOGY

## 2019-05-02 PROCEDURE — 85027 COMPLETE CBC AUTOMATED: CPT | Performed by: OBSTETRICS & GYNECOLOGY

## 2019-05-02 PROCEDURE — 25010000002 BUTORPHANOL PER 1 MG: Performed by: OBSTETRICS & GYNECOLOGY

## 2019-05-02 PROCEDURE — 86901 BLOOD TYPING SEROLOGIC RH(D): CPT | Performed by: OBSTETRICS & GYNECOLOGY

## 2019-05-02 PROCEDURE — 0UQMXZZ REPAIR VULVA, EXTERNAL APPROACH: ICD-10-PCS | Performed by: OBSTETRICS & GYNECOLOGY

## 2019-05-02 PROCEDURE — 86850 RBC ANTIBODY SCREEN: CPT | Performed by: OBSTETRICS & GYNECOLOGY

## 2019-05-02 PROCEDURE — 59410 OBSTETRICAL CARE: CPT | Performed by: OBSTETRICS & GYNECOLOGY

## 2019-05-02 PROCEDURE — 10907ZC DRAINAGE OF AMNIOTIC FLUID, THERAPEUTIC FROM PRODUCTS OF CONCEPTION, VIA NATURAL OR ARTIFICIAL OPENING: ICD-10-PCS | Performed by: OBSTETRICS & GYNECOLOGY

## 2019-05-02 PROCEDURE — 3E033VJ INTRODUCTION OF OTHER HORMONE INTO PERIPHERAL VEIN, PERCUTANEOUS APPROACH: ICD-10-PCS | Performed by: OBSTETRICS & GYNECOLOGY

## 2019-05-02 PROCEDURE — 86900 BLOOD TYPING SEROLOGIC ABO: CPT | Performed by: OBSTETRICS & GYNECOLOGY

## 2019-05-02 RX ORDER — BUTORPHANOL TARTRATE 1 MG/ML
INJECTION, SOLUTION INTRAMUSCULAR; INTRAVENOUS
Status: DISPENSED
Start: 2019-05-02 | End: 2019-05-02

## 2019-05-02 RX ORDER — CARBOPROST TROMETHAMINE 250 UG/ML
250 INJECTION, SOLUTION INTRAMUSCULAR AS NEEDED
Status: DISCONTINUED | OUTPATIENT
Start: 2019-05-02 | End: 2019-05-02 | Stop reason: HOSPADM

## 2019-05-02 RX ORDER — BISACODYL 10 MG
10 SUPPOSITORY, RECTAL RECTAL DAILY PRN
Status: DISCONTINUED | OUTPATIENT
Start: 2019-05-03 | End: 2019-05-04 | Stop reason: HOSPADM

## 2019-05-02 RX ORDER — DEXTROSE, SODIUM CHLORIDE, SODIUM LACTATE, POTASSIUM CHLORIDE, AND CALCIUM CHLORIDE 5; .6; .31; .03; .02 G/100ML; G/100ML; G/100ML; G/100ML; G/100ML
125 INJECTION, SOLUTION INTRAVENOUS CONTINUOUS
Status: DISCONTINUED | OUTPATIENT
Start: 2019-05-02 | End: 2019-05-02

## 2019-05-02 RX ORDER — BUTORPHANOL TARTRATE 1 MG/ML
1 INJECTION, SOLUTION INTRAMUSCULAR; INTRAVENOUS
Status: DISCONTINUED | OUTPATIENT
Start: 2019-05-02 | End: 2019-05-02 | Stop reason: HOSPADM

## 2019-05-02 RX ORDER — OXYTOCIN/0.9 % SODIUM CHLORIDE 30/500 ML
650 PLASTIC BAG, INJECTION (ML) INTRAVENOUS ONCE
Status: DISCONTINUED | OUTPATIENT
Start: 2019-05-02 | End: 2019-05-02 | Stop reason: SDUPTHER

## 2019-05-02 RX ORDER — OXYTOCIN/0.9 % SODIUM CHLORIDE 30/500 ML
650 PLASTIC BAG, INJECTION (ML) INTRAVENOUS ONCE
Status: DISCONTINUED | OUTPATIENT
Start: 2019-05-02 | End: 2019-05-04 | Stop reason: HOSPADM

## 2019-05-02 RX ORDER — LANOLIN 100 %
OINTMENT (GRAM) TOPICAL
Status: DISCONTINUED | OUTPATIENT
Start: 2019-05-02 | End: 2019-05-04 | Stop reason: HOSPADM

## 2019-05-02 RX ORDER — FOLIC ACID 1 MG/1
1 TABLET ORAL DAILY
COMMUNITY
End: 2020-02-17

## 2019-05-02 RX ORDER — PROMETHAZINE HYDROCHLORIDE 25 MG/ML
12.5 INJECTION, SOLUTION INTRAMUSCULAR; INTRAVENOUS EVERY 4 HOURS PRN
Status: DISCONTINUED | OUTPATIENT
Start: 2019-05-02 | End: 2019-05-02 | Stop reason: HOSPADM

## 2019-05-02 RX ORDER — LANOLIN 100 %
OINTMENT (GRAM) TOPICAL
Status: COMPLETED
Start: 2019-05-02 | End: 2019-05-02

## 2019-05-02 RX ORDER — PROMETHAZINE HYDROCHLORIDE 12.5 MG/1
12.5 TABLET ORAL EVERY 4 HOURS PRN
Status: DISCONTINUED | OUTPATIENT
Start: 2019-05-02 | End: 2019-05-02 | Stop reason: HOSPADM

## 2019-05-02 RX ORDER — PROMETHAZINE HYDROCHLORIDE 12.5 MG/1
12.5 SUPPOSITORY RECTAL EVERY 4 HOURS PRN
Status: DISCONTINUED | OUTPATIENT
Start: 2019-05-02 | End: 2019-05-02 | Stop reason: HOSPADM

## 2019-05-02 RX ORDER — OXYTOCIN/0.9 % SODIUM CHLORIDE 30/500 ML
85 PLASTIC BAG, INJECTION (ML) INTRAVENOUS ONCE
Status: DISCONTINUED | OUTPATIENT
Start: 2019-05-02 | End: 2019-05-02 | Stop reason: SDUPTHER

## 2019-05-02 RX ORDER — CALCIUM CARBONATE 200(500)MG
1 TABLET,CHEWABLE ORAL 3 TIMES DAILY PRN
Status: DISCONTINUED | OUTPATIENT
Start: 2019-05-02 | End: 2019-05-04 | Stop reason: HOSPADM

## 2019-05-02 RX ORDER — SODIUM CHLORIDE 0.9 % (FLUSH) 0.9 %
1-10 SYRINGE (ML) INJECTION AS NEEDED
Status: DISCONTINUED | OUTPATIENT
Start: 2019-05-02 | End: 2019-05-04 | Stop reason: HOSPADM

## 2019-05-02 RX ORDER — PROMETHAZINE HYDROCHLORIDE 25 MG/ML
12.5 INJECTION, SOLUTION INTRAMUSCULAR; INTRAVENOUS EVERY 4 HOURS PRN
Status: DISCONTINUED | OUTPATIENT
Start: 2019-05-02 | End: 2019-05-04 | Stop reason: HOSPADM

## 2019-05-02 RX ORDER — OXYTOCIN/0.9 % SODIUM CHLORIDE 30/500 ML
85 PLASTIC BAG, INJECTION (ML) INTRAVENOUS ONCE
Status: COMPLETED | OUTPATIENT
Start: 2019-05-02 | End: 2019-05-02

## 2019-05-02 RX ORDER — SODIUM CHLORIDE 0.9 % (FLUSH) 0.9 %
3 SYRINGE (ML) INJECTION EVERY 12 HOURS SCHEDULED
Status: DISCONTINUED | OUTPATIENT
Start: 2019-05-02 | End: 2019-05-02 | Stop reason: HOSPADM

## 2019-05-02 RX ORDER — PRENATAL VIT/IRON FUM/FOLIC AC 27MG-0.8MG
1 TABLET ORAL DAILY
Status: DISCONTINUED | OUTPATIENT
Start: 2019-05-02 | End: 2019-05-04 | Stop reason: HOSPADM

## 2019-05-02 RX ORDER — OXYTOCIN/0.9 % SODIUM CHLORIDE 30/500 ML
2-20 PLASTIC BAG, INJECTION (ML) INTRAVENOUS
Status: DISCONTINUED | OUTPATIENT
Start: 2019-05-02 | End: 2019-05-02 | Stop reason: HOSPADM

## 2019-05-02 RX ORDER — SODIUM CHLORIDE 0.9 % (FLUSH) 0.9 %
3-10 SYRINGE (ML) INJECTION AS NEEDED
Status: DISCONTINUED | OUTPATIENT
Start: 2019-05-02 | End: 2019-05-02 | Stop reason: HOSPADM

## 2019-05-02 RX ORDER — CARBOPROST TROMETHAMINE 250 UG/ML
250 INJECTION, SOLUTION INTRAMUSCULAR ONCE
Status: DISCONTINUED | OUTPATIENT
Start: 2019-05-02 | End: 2019-05-04 | Stop reason: HOSPADM

## 2019-05-02 RX ORDER — DOCUSATE SODIUM 100 MG/1
100 CAPSULE, LIQUID FILLED ORAL DAILY
Status: DISCONTINUED | OUTPATIENT
Start: 2019-05-02 | End: 2019-05-04 | Stop reason: HOSPADM

## 2019-05-02 RX ORDER — FERROUS SULFATE TAB EC 324 MG (65 MG FE EQUIVALENT) 324 (65 FE) MG
324 TABLET DELAYED RESPONSE ORAL
Status: DISCONTINUED | OUTPATIENT
Start: 2019-05-02 | End: 2019-05-04 | Stop reason: HOSPADM

## 2019-05-02 RX ORDER — ACETAMINOPHEN 500 MG
1000 TABLET ORAL EVERY 8 HOURS
Status: DISCONTINUED | OUTPATIENT
Start: 2019-05-02 | End: 2019-05-04 | Stop reason: HOSPADM

## 2019-05-02 RX ORDER — DEXTROSE, SODIUM CHLORIDE, SODIUM LACTATE, POTASSIUM CHLORIDE, AND CALCIUM CHLORIDE 5; .6; .31; .03; .02 G/100ML; G/100ML; G/100ML; G/100ML; G/100ML
INJECTION, SOLUTION INTRAVENOUS
Status: COMPLETED
Start: 2019-05-02 | End: 2019-05-02

## 2019-05-02 RX ORDER — MISOPROSTOL 200 UG/1
600 TABLET ORAL ONCE
Status: DISCONTINUED | OUTPATIENT
Start: 2019-05-02 | End: 2019-05-04 | Stop reason: HOSPADM

## 2019-05-02 RX ORDER — IBUPROFEN 600 MG/1
TABLET ORAL
Status: COMPLETED
Start: 2019-05-02 | End: 2019-05-02

## 2019-05-02 RX ORDER — PRENATAL VIT/IRON FUM/FOLIC AC 27MG-0.8MG
1 TABLET ORAL DAILY
Status: DISCONTINUED | OUTPATIENT
Start: 2019-05-02 | End: 2019-05-02 | Stop reason: SDUPTHER

## 2019-05-02 RX ORDER — IBUPROFEN 600 MG/1
600 TABLET ORAL EVERY 8 HOURS SCHEDULED
Status: DISCONTINUED | OUTPATIENT
Start: 2019-05-02 | End: 2019-05-04 | Stop reason: HOSPADM

## 2019-05-02 RX ORDER — LIDOCAINE HYDROCHLORIDE 10 MG/ML
5 INJECTION, SOLUTION EPIDURAL; INFILTRATION; INTRACAUDAL; PERINEURAL AS NEEDED
Status: DISCONTINUED | OUTPATIENT
Start: 2019-05-02 | End: 2019-05-02 | Stop reason: HOSPADM

## 2019-05-02 RX ORDER — FOLIC ACID 1 MG/1
1 TABLET ORAL DAILY
Status: DISCONTINUED | OUTPATIENT
Start: 2019-05-02 | End: 2019-05-04 | Stop reason: HOSPADM

## 2019-05-02 RX ORDER — OXYTOCIN/0.9 % SODIUM CHLORIDE 30/500 ML
85 PLASTIC BAG, INJECTION (ML) INTRAVENOUS ONCE
Status: DISCONTINUED | OUTPATIENT
Start: 2019-05-02 | End: 2019-05-04 | Stop reason: HOSPADM

## 2019-05-02 RX ORDER — OXYTOCIN/0.9 % SODIUM CHLORIDE 30/500 ML
PLASTIC BAG, INJECTION (ML) INTRAVENOUS
Status: COMPLETED
Start: 2019-05-02 | End: 2019-05-02

## 2019-05-02 RX ORDER — OXYTOCIN/0.9 % SODIUM CHLORIDE 30/500 ML
PLASTIC BAG, INJECTION (ML) INTRAVENOUS
Status: DISPENSED
Start: 2019-05-02 | End: 2019-05-03

## 2019-05-02 RX ORDER — METHYLERGONOVINE MALEATE 0.2 MG/ML
200 INJECTION INTRAVENOUS ONCE AS NEEDED
Status: DISCONTINUED | OUTPATIENT
Start: 2019-05-02 | End: 2019-05-02 | Stop reason: HOSPADM

## 2019-05-02 RX ORDER — MISOPROSTOL 200 UG/1
800 TABLET ORAL AS NEEDED
Status: DISCONTINUED | OUTPATIENT
Start: 2019-05-02 | End: 2019-05-02 | Stop reason: HOSPADM

## 2019-05-02 RX ADMIN — DEXTROSE, SODIUM CHLORIDE, SODIUM LACTATE, POTASSIUM CHLORIDE, AND CALCIUM CHLORIDE 125 ML/HR: 5; .6; .31; .03; .02 INJECTION, SOLUTION INTRAVENOUS at 11:44

## 2019-05-02 RX ADMIN — Medication 2 MILLI-UNITS/MIN: at 05:20

## 2019-05-02 RX ADMIN — OXYTOCIN-SODIUM CHLORIDE 0.9% IV SOLN 30 UNIT/500ML 2 MILLI-UNITS/MIN: 30-0.9/5 SOLUTION at 05:20

## 2019-05-02 RX ADMIN — IBUPROFEN 600 MG: 600 TABLET ORAL at 17:07

## 2019-05-02 RX ADMIN — ACETAMINOPHEN 1000 MG: 500 TABLET ORAL at 17:47

## 2019-05-02 RX ADMIN — PRENATAL VIT W/ FE FUMARATE-FA TAB 27-0.8 MG 1 TABLET: 27-0.8 TAB at 17:47

## 2019-05-02 RX ADMIN — FERROUS SULFATE TAB EC 324 MG (65 MG FE EQUIVALENT) 324 MG: 324 (65 FE) TABLET DELAYED RESPONSE at 17:47

## 2019-05-02 RX ADMIN — DOCUSATE SODIUM 100 MG: 100 CAPSULE, LIQUID FILLED ORAL at 17:47

## 2019-05-02 RX ADMIN — DEXTROSE, SODIUM CHLORIDE, SODIUM LACTATE, POTASSIUM CHLORIDE, AND CALCIUM CHLORIDE 125 ML/HR: 5; .6; .31; .03; .02 INJECTION, SOLUTION INTRAVENOUS at 04:37

## 2019-05-02 RX ADMIN — SODIUM CHLORIDE, SODIUM LACTATE, POTASSIUM CHLORIDE, CALCIUM CHLORIDE AND DEXTROSE MONOHYDRATE 125 ML/HR: 5; 600; 310; 30; 20 INJECTION, SOLUTION INTRAVENOUS at 04:37

## 2019-05-02 RX ADMIN — SODIUM CHLORIDE, SODIUM LACTATE, POTASSIUM CHLORIDE, CALCIUM CHLORIDE AND DEXTROSE MONOHYDRATE 125 ML/HR: 5; 600; 310; 30; 20 INJECTION, SOLUTION INTRAVENOUS at 11:44

## 2019-05-02 RX ADMIN — OXYTOCIN-SODIUM CHLORIDE 0.9% IV SOLN 30 UNIT/500ML 85 ML/HR: 30-0.9/5 SOLUTION at 16:03

## 2019-05-02 RX ADMIN — Medication 1 APPLICATION: at 16:03

## 2019-05-02 RX ADMIN — IBUPROFEN 600 MG: 600 TABLET ORAL at 21:04

## 2019-05-02 RX ADMIN — BENZOCAINE AND LEVOMENTHOL 1 APPLICATION: 200; 5 SPRAY TOPICAL at 16:03

## 2019-05-02 RX ADMIN — BUTORPHANOL TARTRATE 1 MG: 1 INJECTION, SOLUTION INTRAMUSCULAR; INTRAVENOUS at 11:39

## 2019-05-02 NOTE — PLAN OF CARE
Problem: Patient Care Overview  Goal: Plan of Care Review  Outcome: Ongoing (interventions implemented as appropriate)   05/02/19 0448   Coping/Psychosocial   Plan of Care Reviewed With patient   OTHER   Outcome Summary scheduled induction     Goal: Individualization and Mutuality  Outcome: Ongoing (interventions implemented as appropriate)    Goal: Discharge Needs Assessment  Outcome: Ongoing (interventions implemented as appropriate)    Goal: Interprofessional Rounds/Family Conf  Outcome: Ongoing (interventions implemented as appropriate)      Problem: Labor (Cervical Ripen, Induct, Augment) (Adult,Obstetrics,Pediatric)  Goal: Signs and Symptoms of Listed Potential Problems Will be Absent, Minimized or Managed (Labor)  Outcome: Ongoing (interventions implemented as appropriate)

## 2019-05-02 NOTE — L&D DELIVERY NOTE
[facility]  Vaginal Delivery Note    Delivery     Delivery: Vaginal, Spontaneous     YOB: 2020    Time of Birth: 1:53 PM      Anesthesia: None     Delivering clinician: Milan Chew       Delivery narrative: Patient progressed second stage of labor developed bradycardia progressed with large amount of encouragement by staff and myself.  On delivery head shoulder dystocia noted.  Did not respond to gentle traction.  Blue maneuver initially tried.  Then suprapubic pressure and Go maneuver applied at same time and there was response to this.  Shoulders came with minimal traction.  Baby was handed off to waiting nursery team.  Segment of cord for gases placenta delivered spontaneously appeared intact.  Baby was moving both arms equally symmetrically and well    Complications  IUGR by ultrasound    Infant    Findings: female  infant     Infant observations: Weight: 3300 g (7 lb 4.4 oz)   Length: 19.5  in  Observations/Comments:         Apgars: 7   @ 1 minute /    9   @ 5 minutes     Placenta, Cord, and Fluid    Placenta delivered  Spontaneous  at   5/2/2019  2:00 PM     Cord: 3 vessels  present.   Nuchal Cord?  yes; Number of nuchal loops present:       Cord blood obtained: Yes    Cord gases obtained:  Yes      Repair    Episiotomy: None    Lacerations: Yes  Laceration Information  Laceration Repaired?   Perineal: None       Periurethral:         Labial:         Sulcus:         Vaginal: No       Cervical: No          Suture used for repair: 2-0 Vicryl   Estimated Blood Loss: Est. Blood Loss (mL): 161 mL(Filed from Delivery Summary) (05/02/19 1154)     Suture used for repair: 2-0 Vicryl and 2-0 Vicryl     Disposition  Mother to Remain in LD  in stable condition currently.  Baby to remains with mom  in stable condition currently.              This document has been electronically signed by Milan Chew MD on May 2, 2019 4:40 PM

## 2019-05-02 NOTE — PROGRESS NOTES
Patient 2 to 3 cm about 75% -1 to -2 AROM performed internal lead for electronic fetal heart rate monitoring placed internal pressure catheter placed.  Clinical situation reviewed with patient questions the

## 2019-05-02 NOTE — PLAN OF CARE
Problem: Patient Care Overview  Goal: Plan of Care Review  Outcome: Ongoing (interventions implemented as appropriate)   05/02/19 1810   Coping/Psychosocial   Plan of Care Reviewed With patient   OTHER   Outcome Summary vss, ambulates, due to void since delivery, ff displaced to the right, lochia light, pain controlled, tolerating PO, encouraged pt to attempt void   Plan of Care Review   Progress improving     Goal: Individualization and Mutuality  Outcome: Ongoing (interventions implemented as appropriate)    Goal: Discharge Needs Assessment  Outcome: Ongoing (interventions implemented as appropriate)    Goal: Interprofessional Rounds/Family Conf  Outcome: Ongoing (interventions implemented as appropriate)      Problem: Postpartum (Vaginal Delivery) (Adult,Obstetrics,Pediatric)  Goal: Signs and Symptoms of Listed Potential Problems Will be Absent, Minimized or Managed (Postpartum)  Outcome: Ongoing (interventions implemented as appropriate)      Problem: Breastfeeding (Adult,Obstetrics,Pediatric)  Goal: Signs and Symptoms of Listed Potential Problems Will be Absent, Minimized or Managed (Breastfeeding)  Outcome: Ongoing (interventions implemented as appropriate)

## 2019-05-03 ENCOUNTER — RESULTS ENCOUNTER (OUTPATIENT)
Dept: OBSTETRICS AND GYNECOLOGY | Facility: CLINIC | Age: 29
End: 2019-05-03

## 2019-05-03 VITALS
BODY MASS INDEX: 35.33 KG/M2 | HEIGHT: 62 IN | HEART RATE: 73 BPM | RESPIRATION RATE: 18 BRPM | TEMPERATURE: 99.2 F | DIASTOLIC BLOOD PRESSURE: 66 MMHG | WEIGHT: 192 LBS | SYSTOLIC BLOOD PRESSURE: 103 MMHG | OXYGEN SATURATION: 99 %

## 2019-05-03 DIAGNOSIS — IMO0002 POOR FETAL GROWTH: ICD-10-CM

## 2019-05-03 DIAGNOSIS — O36.5921 IUGR (INTRAUTERINE GROWTH RESTRICTION) AFFECTING CARE OF MOTHER, SECOND TRIMESTER, FETUS 1: ICD-10-CM

## 2019-05-03 DIAGNOSIS — O36.5920: ICD-10-CM

## 2019-05-03 LAB
BASOPHILS # BLD AUTO: 0.05 10*3/MM3 (ref 0–0.2)
BASOPHILS NFR BLD AUTO: 0.3 % (ref 0–1.5)
DEPRECATED RDW RBC AUTO: 41.5 FL (ref 37–54)
EOSINOPHIL # BLD AUTO: 0.14 10*3/MM3 (ref 0–0.4)
EOSINOPHIL NFR BLD AUTO: 0.9 % (ref 0.3–6.2)
ERYTHROCYTE [DISTWIDTH] IN BLOOD BY AUTOMATED COUNT: 14.4 % (ref 12.3–15.4)
HCT VFR BLD AUTO: 29.6 % (ref 34–46.6)
HGB BLD-MCNC: 9.5 G/DL (ref 12–15.9)
IMM GRANULOCYTES # BLD AUTO: 0.15 10*3/MM3 (ref 0–0.05)
IMM GRANULOCYTES NFR BLD AUTO: 1 % (ref 0–0.5)
LYMPHOCYTES # BLD AUTO: 2.3 10*3/MM3 (ref 0.7–3.1)
LYMPHOCYTES NFR BLD AUTO: 14.8 % (ref 19.6–45.3)
MCH RBC QN AUTO: 25.7 PG (ref 26.6–33)
MCHC RBC AUTO-ENTMCNC: 32.1 G/DL (ref 31.5–35.7)
MCV RBC AUTO: 80 FL (ref 79–97)
MONOCYTES # BLD AUTO: 0.97 10*3/MM3 (ref 0.1–0.9)
MONOCYTES NFR BLD AUTO: 6.3 % (ref 5–12)
NEUTROPHILS # BLD AUTO: 11.91 10*3/MM3 (ref 1.7–7)
NEUTROPHILS NFR BLD AUTO: 76.7 % (ref 42.7–76)
NRBC BLD AUTO-RTO: 0 /100 WBC (ref 0–0.2)
PLATELET # BLD AUTO: 258 10*3/MM3 (ref 140–450)
PMV BLD AUTO: 9.8 FL (ref 6–12)
RBC # BLD AUTO: 3.7 10*6/MM3 (ref 3.77–5.28)
WBC NRBC COR # BLD: 15.52 10*3/MM3 (ref 3.4–10.8)

## 2019-05-03 PROCEDURE — 85025 COMPLETE CBC W/AUTO DIFF WBC: CPT | Performed by: GENERAL PRACTICE

## 2019-05-03 RX ORDER — OXYCODONE AND ACETAMINOPHEN 10; 325 MG/1; MG/1
1 TABLET ORAL EVERY 4 HOURS PRN
Status: DISCONTINUED | OUTPATIENT
Start: 2019-05-03 | End: 2019-05-04 | Stop reason: HOSPADM

## 2019-05-03 RX ORDER — IBUPROFEN 600 MG/1
600 TABLET ORAL EVERY 6 HOURS PRN
Qty: 120 TABLET | Refills: 0 | Status: SHIPPED | OUTPATIENT
Start: 2019-05-03 | End: 2019-06-02

## 2019-05-03 RX ADMIN — IBUPROFEN 600 MG: 600 TABLET ORAL at 13:47

## 2019-05-03 RX ADMIN — ACETAMINOPHEN 1000 MG: 500 TABLET ORAL at 10:21

## 2019-05-03 RX ADMIN — FERROUS SULFATE TAB EC 324 MG (65 MG FE EQUIVALENT) 324 MG: 324 (65 FE) TABLET DELAYED RESPONSE at 07:39

## 2019-05-03 RX ADMIN — FOLIC ACID 1 MG: 1 TABLET ORAL at 09:12

## 2019-05-03 RX ADMIN — DOCUSATE SODIUM 100 MG: 100 CAPSULE, LIQUID FILLED ORAL at 09:12

## 2019-05-03 RX ADMIN — PRENATAL VIT W/ FE FUMARATE-FA TAB 27-0.8 MG 1 TABLET: 27-0.8 TAB at 09:11

## 2019-05-03 RX ADMIN — IBUPROFEN 600 MG: 600 TABLET ORAL at 06:36

## 2019-05-03 RX ADMIN — OXYCODONE HYDROCHLORIDE AND ACETAMINOPHEN 1 TABLET: 10; 325 TABLET ORAL at 03:50

## 2019-05-03 RX ADMIN — OXYCODONE HYDROCHLORIDE AND ACETAMINOPHEN 1 TABLET: 10; 325 TABLET ORAL at 15:00

## 2019-05-03 NOTE — DISCHARGE SUMMARY
[facility]  Renetta Cote  : 1990  MRN: [id number]  CSN: [id number]    Discharge Summary:    Date of Admission: 2019  Date of Discharge:  5/3/2019    Admitting Diagnosis:  1. IUP @ 40w0d  2. for induction of labor  3 IUGR     Discharge Diagnosis:  1. S/P        History and Hospital Course:  Patient is a   who presents for induction of labor.  Her pregnancy was complicated by IUGR.  Please see History and Physical for full details.       She was admitted and progressed in labor with pitocin augmentation to completely dilated. She had a vaginal delivery of a  viable female   infant who weighed 3300 g (7 lb 4.4 oz)  and APGARs of        APGARS  One minute Five minutes Ten minutes Fifteen minutes Twenty minutes   Skin color: 0   1             Heart rate: 2   2             Grimace: 2   2              Muscle tone: 2   2              Breathin   2              Totals: 7   9              . No immediate complications were encountered. Please see procedure note for full details.      Her postpartum course has been unremarkable. She had no signs or symptoms of acute blood loss anemia. She was ambulating well, voiding without difficulty and lochia was within normal limits. She was breast feeding without difficulty. She was stable for discharge on postpartum day 1.      Precautions and instructions were discussed with her including but not limited to maintaining a regular diet at home, practicing local hygiene, pelvic rest, and signs and symptoms to report including heavy bleeding, frequent passage of clots, fainting or dizziness, foul odor of lochia, increasing pain, fever, or any other concerns.    She was asked to follow up in the office in 4 weeks.    Condition: Stable  Discharge Disposition: home  Discharge Diet:   Diet Instructions     Diet: Regular      Discharge Diet:  Regular    Diet: Regular      Discharge Diet:  Regular    Diet: Regular      Discharge Diet:  Regular     "    Activity at Discharge:   Activity Instructions     Pelvic Rest      Pelvic Rest      Additional Activity Instructions:      Notify Dr of... heavy bleeding, passing clots, foul odor to your discharge, temperature above 100.4, burning when urinating, gapping or drainage from incision or episiotomy, or for pain not relieved by taking pain medication, redness or streaking in breasts, pain or redness in legs.    Take all medications as prescribed.  Continue taking iron or prenatal vitamin while breastfeeding or until you run out.  Take rest periods several times during the day.  \"Baby Blues\" are normal and may be present around the 3rd-4th day after delivery. If they last longer than 2-3 days, please let your Dr know.  Pelvic rest for 6 weeks. No douching, tampons, or intercourse  No driving for 2 weeks  No lifting anything heavier than the baby  Wear a good supportive bra 24 hours/day to prevent engorgement                  Discharge Medications:       Discharge Medications      New Medications      Instructions Start Date   ibuprofen 600 MG tablet  Commonly known as:  ADVIL,MOTRIN   600 mg, Oral, Every 6 Hours PRN         Continue These Medications      Instructions Start Date   ferrous sulfate 325 (65 FE) MG tablet   325 mg, Oral, 2 Times Daily Before Meals      folic acid 1 MG tablet  Commonly known as:  FOLVITE   1 mg, Oral, Daily      Prenatal Vitamin 27-0.8 MG tablet   1 tablet, Oral, Daily           Patient will restart all home medications including prenatal vitamins.        This document has been electronically signed by Milan Chew MD on May 3, 2019 12:55 PM    "

## 2019-05-03 NOTE — PROGRESS NOTES
[facility]  Renetta Cote  : 1990  MRN: [id number]  CSN: [id number]    Postpartum Day #1  Maria Isabel Cote is a 28 y.o . She delivered a healthy infant yesterday. She has done very well overnight. She states pain is very well controlled, only required some pain meds overnight. She has been up and ambulating around the room and to the bathroom without difficulty. She is passing gas and has had a bowel movement. She is having a normal period amount of bleeding, and has changed through 3 pads since delivery. She will be breastfeeding her baby. She has no other concerns at this time.      Objective     Min/max vitals past 24 hours:   Temp  Min: 97.7 °F (36.5 °C)  Max: 99.6 °F (37.6 °C)  BP  Min: 97/53  Max: 137/59  Pulse  Min: 75  Max: 108  Pulse  Min: 75  Max: 108        Abdomen: soft, non-tender; bowel sounds normal; no masses   fundus firm and non-tender   Calves: Nontender, no cords palpable   Pelvic: deferred     Lab Results   Component Value Date    WBC 15.52 (H) 2019    HGB 9.5 (L) 2019    HCT 29.6 (L) 2019    MCV 80.0 2019     2019    RH Positive 2019    HEPBSAG Negative 10/02/2018        Assessment   1. Postpartum Day #1 S/P vaginal delivery. Pt is doing well and meeting all appropriate milestones. Will watch patient overnight and anticipate discharge tomorrow.      Plan   1. Continue routine postpartum care  2. Continued pain control  3. Anticipating discharge tomorrow              This document has been electronically signed by Danita Moran MD on May 3, 2019 9:13 AM      I have seen and evaluated the patient.  I have discussed the case with the resident. I have reviewed the notes, assessment and plan, and/or procedures performed by the resident. I concur with the resident’s documentation.        This document has been electronically signed by Milan Chew MD on May 3, 2019 9:26 PM

## 2019-05-03 NOTE — PLAN OF CARE
Problem: Patient Care Overview  Goal: Plan of Care Review   05/02/19 1810 05/03/19 0523   Coping/Psychosocial   Plan of Care Reviewed With patient --    OTHER   Outcome Summary --  VSS, ambulates, voids, pain controlled w meds,    Plan of Care Review   Progress improving --

## 2019-05-03 NOTE — PAYOR COMM NOTE
"Marycarmen Torres  (P): [phone]  (F): [phone]      Renetta Cote (28 y.o. Female)     Date of Birth Social Security Number Address Home Phone MRN    1990  [id number] [facility] [address] [phone] [id number]    [employer] Marital Status          None Single       Admission Date Admission Type Admitting Provider Attending Provider Department, Room/Bed    5/2/19 Elective Milan Chew MD Neely, Thomas S, MD [facility] MOTHER BABY, M756/1    Discharge Date Discharge Disposition Discharge Destination                       Attending Provider:  Milan Chew MD    Allergies:  No Known Allergies    Isolation:  None   Infection:  None   Code Status:  CPR    Ht:  157.5 cm (62\")   Wt:  87.1 kg (192 lb)    Admission Cmt:  None   Principal Problem:  O80                Active Insurance as of 5/2/2019     Primary Coverage     Payor Plan Insurance Group Employer/Plan Group    [employer] [place] [employer] [place]      Payor Plan Address Payor Plan Phone Number Payor Plan Fax Number Effective Dates    [address] [address] [phone]  8/17/2016 - None Entered    [facility] [id number]       Subscriber Name Subscriber Birth Date Member ID       RENETTA COTE 1990 [id number]                 Emergency Contacts      (Rel.) Home Phone Work Phone Mobile Phone    Kassi Jimenez (Mother) [phone] -- [phone]    Oren Prasad (Significant Other) [phone] -- [phone]            History & Physical     No notes of this type exist for this encounter.        Hospital Medications (all)       Dose Frequency Start End    acetaminophen (TYLENOL) tablet 1,000 mg 1,000 mg Every 8 Hours 5/2/2019     Sig - Route: Take 2 tablets by mouth Every 8 (Eight) Hours. - Oral    benzocaine-lanolin-aloe vera (DERMOPLAST) 20-0.5 % topical spray  As Needed 5/2/2019     Sig - Route: Apply  topically to the appropriate area as directed As Needed for Mild Pain  (perineal pain). - " Topical    bisacodyl (DULCOLAX) suppository 10 mg 10 mg Daily PRN 5/3/2019     Sig - Route: Insert 1 suppository into the rectum Daily As Needed for Constipation. - Rectal    calcium carbonate (TUMS) chewable tablet 500 mg (200 mg elemental) 1 tablet 3 Times Daily PRN 5/2/2019     Sig - Route: Chew 500 mg 3 (Three) Times a Day As Needed for Heartburn. - Oral    carboprost (HEMABATE) injection 250 mcg 250 mcg Once 5/2/2019     Sig - Route: Inject 1 mL into the appropriate muscle as directed by prescriber 1 (One) Time. - Intramuscular    docusate sodium (COLACE) capsule 100 mg 100 mg Daily 5/2/2019     Sig - Route: Take 1 capsule by mouth Daily. - Oral    ferrous sulfate EC tablet 324 mg 324 mg 2 Times Daily Before Meals 5/2/2019     Sig - Route: Take 1 tablet by mouth 2 (Two) Times a Day Before Meals. - Oral    folic acid (FOLVITE) tablet 1 mg 1 mg Daily 5/2/2019     Sig - Route: Take 1 tablet by mouth Daily. - Oral    hydrocortisone (ANUSOL-HC) 2.5 % rectal cream 1 application 1 application As Needed 5/2/2019     Sig - Route: Insert 1 application into the rectum As Needed for Hemorrhoids. - Rectal    ibuprofen (ADVIL,MOTRIN) tablet 600 mg 600 mg Every 8 Hours Scheduled 5/2/2019     Sig - Route: Take 1 tablet by mouth Every 8 (Eight) Hours. - Oral    lanolin ointment  Every 1 Hour PRN 5/2/2019     Sig - Route: Apply  topically to the appropriate area as directed Every 1 (One) Hour As Needed for Dry Skin (nipple pain). - Topical    magnesium hydroxide (MILK OF MAGNESIA) suspension 2400 mg/10mL 10 mL 10 mL Daily PRN 5/2/2019     Sig - Route: Take 10 mL by mouth Daily As Needed for Constipation. - Oral    misoprostol (CYTOTEC) tablet 600 mcg 600 mcg Once 5/2/2019     Sig - Route: Take 3 tablets by mouth 1 (One) Time. - Oral    oxyCODONE-acetaminophen (PERCOCET)  MG per tablet 1 tablet 1 tablet Every 4 Hours PRN 5/3/2019 5/13/2019    Sig - Route: Take 1 tablet by mouth Every 4 (Four) Hours As Needed for Severe Pain  ". - Oral    oxytocin (PITOCIN) 30 units in 0.9% sodium chloride 500 mL (premix) 650 mL/hr Once 5/2/2019     Sig - Route: Infuse 39 Units/hr into a venous catheter 1 (One) Time. - Intravenous    Cosign for Ordering: Required by Milan Chew MD    Linked Group 1:  \"Followed by\" Linked Group Details        oxytocin (PITOCIN) 30 units in 0.9% sodium chloride 500 mL (premix) 85 mL/hr Once 5/2/2019 5/2/2019    Sig - Route: Infuse 5.1 Units/hr into a venous catheter 1 (One) Time. - Intravenous    Cosign for Ordering: Required by Milan Chew MD    Linked Group 1:  \"Followed by\" Linked Group Details        oxytocin (PITOCIN) 30 units in 0.9% sodium chloride 500 mL (premix) 650 mL/hr Once 5/2/2019     Sig - Route: Infuse 39 Units/hr into a venous catheter 1 (One) Time. - Intravenous    Linked Group 2:  \"Followed by\" Linked Group Details        oxytocin (PITOCIN) 30 units in 0.9% sodium chloride 500 mL (premix) 85 mL/hr Once 5/2/2019     Sig - Route: Infuse 5.1 Units/hr into a venous catheter 1 (One) Time. - Intravenous    Linked Group 2:  \"Followed by\" Linked Group Details        polyethylene glycol 3350 powder (packet) 17 g Daily PRN 5/2/2019     Sig - Route: Take 17 g by mouth Daily As Needed for Constipation. - Oral    prenatal vitamin 27-0.8 tablet 1 tablet 1 tablet Daily 5/2/2019     Sig - Route: Take 1 tablet by mouth Daily. - Oral    promethazine (PHENERGAN) injection 12.5 mg 12.5 mg Every 4 Hours PRN 5/2/2019     Sig - Route: Infuse 0.5 mL into a venous catheter Every 4 (Four) Hours As Needed for Nausea or Vomiting. - Intravenous    sodium chloride 0.9 % flush 1-10 mL 1-10 mL As Needed 5/2/2019     Sig - Route: Infuse 1-10 mL into a venous catheter As Needed for Line Care. - Intravenous    benzocaine (AMERICAINE) 20 % rectal ointment 1 application (Discontinued) 1 application As Needed 5/2/2019 5/2/2019    Sig - Route: Insert 1 application into the rectum As Needed for Hemorrhoids. - Rectal    Reason for " Discontinue: Availability    butorphanol (STADOL) injection 1 mg (Discontinued) 1 mg Every 3 Hours PRN 5/2/2019 5/2/2019    Sig - Route: Infuse 1 mL into a venous catheter Every 3 (Three) Hours As Needed for Moderate Pain . - Intravenous    Reason for Discontinue: Patient Transfer    butorphanol (STADOL) injection 2 mg (Discontinued) 2 mg Every 3 Hours PRN 5/2/2019 5/2/2019    Sig - Route: Infuse 1 mL into a venous catheter Every 3 (Three) Hours As Needed for Severe Pain . - Intravenous    Reason for Discontinue: Patient Transfer    carboprost (HEMABATE) injection 250 mcg (Discontinued) 250 mcg As Needed 5/2/2019 5/2/2019    Sig - Route: Inject 1 mL into the appropriate muscle as directed by prescriber As Needed (hemorrhage). - Intramuscular    Reason for Discontinue: Patient Transfer    dextrose 5 % and lactated Ringer's infusion (Discontinued) 125 mL/hr Continuous 5/2/2019 5/2/2019    Sig - Route: Infuse 125 mL/hr into a venous catheter Continuous. - Intravenous    lactated ringers bolus 1,000 mL (Discontinued) 1,000 mL Once As Needed 5/2/2019 5/2/2019    Sig - Route: Infuse 1,000 mL into a venous catheter 1 (One) Time As Needed (Hypotension). - Intravenous    Reason for Discontinue: Patient Transfer    lidocaine PF 1% (XYLOCAINE) injection 5 mL (Discontinued) 5 mL As Needed 5/2/2019 5/2/2019    Sig - Route: Inject 5 mL into the appropriate area of the skin as directed by provider As Needed (IV starts). - Intradermal    Reason for Discontinue: Patient Transfer    methylergonovine (METHERGINE) injection 200 mcg (Discontinued) 200 mcg Once As Needed 5/2/2019 5/2/2019    Sig - Route: Inject 1 mL into the appropriate muscle as directed by prescriber 1 (One) Time As Needed (hemorrhage). - Intramuscular    Reason for Discontinue: Patient Transfer    misoprostol (CYTOTEC) tablet 800 mcg (Discontinued) 800 mcg As Needed 5/2/2019 5/2/2019    Sig - Route: Insert 4 tablets into the rectum As Needed (Postpartum Hemorrhage). -  "Rectal    Reason for Discontinue: Patient Transfer    oxytocin (PITOCIN) 30 units in 0.9% sodium chloride 500 mL (premix) (Discontinued) 2-20 manuel-units/min Titrated 5/2/2019 5/2/2019    Sig - Route: Infuse 0.002-0.02 Units/min into a venous catheter Dose Adjusted By Provider As Needed. - Intravenous    Reason for Discontinue: Patient Transfer    oxytocin (PITOCIN) 30 units in 0.9% sodium chloride 500 mL (premix) (Discontinued) 650 mL/hr Once 5/2/2019 5/2/2019    Sig - Route: Infuse 39 Units/hr into a venous catheter 1 (One) Time. - Intravenous    Reason for Discontinue: Duplicate order    Linked Group 3:  \"Followed by\" Linked Group Details        oxytocin (PITOCIN) 30 units in 0.9% sodium chloride 500 mL (premix) (Discontinued) 85 mL/hr Once 5/2/2019 5/2/2019    Sig - Route: Infuse 5.1 Units/hr into a venous catheter 1 (One) Time. - Intravenous    Reason for Discontinue: Duplicate order    Linked Group 3:  \"Followed by\" Linked Group Details        pramoxine-hydrocortisone 1-1 % foam (Discontinued)  As Needed 5/2/2019 5/2/2019    Sig - Route: Apply  topically to the appropriate area as directed As Needed for Hemorrhoids. - Topical    Reason for Discontinue: Availability    prenatal vitamin 27-0.8 tablet 1 tablet (Discontinued) 1 tablet Daily 5/2/2019 5/2/2019    Sig - Route: Take 1 tablet by mouth Daily. - Oral    Reason for Discontinue: Duplicate order    promethazine (PHENERGAN) injection 12.5 mg (Discontinued) 12.5 mg Every 4 Hours PRN 5/2/2019 5/2/2019    Sig - Route: Infuse 0.5 mL into a venous catheter Every 4 (Four) Hours As Needed for Nausea or Vomiting. - Intravenous    Reason for Discontinue: Patient Transfer    Linked Group 4:  \"Or\" Linked Group Details        promethazine (PHENERGAN) injection 12.5 mg (Discontinued) 12.5 mg Every 4 Hours PRN 5/2/2019 5/2/2019    Sig - Route: Inject 0.5 mL into the appropriate muscle as directed by prescriber Every 4 (Four) Hours As Needed for Nausea or Vomiting. - " "Intramuscular    Reason for Discontinue: Patient Transfer    Linked Group 4:  \"Or\" Linked Group Details        promethazine (PHENERGAN) suppository 12.5 mg (Discontinued) 12.5 mg Every 4 Hours PRN 5/2/2019 5/2/2019    Sig - Route: Insert 1 suppository into the rectum Every 4 (Four) Hours As Needed for Nausea or Vomiting. - Rectal    Reason for Discontinue: Patient Transfer    Linked Group 4:  \"Or\" Linked Group Details        promethazine (PHENERGAN) tablet 12.5 mg (Discontinued) 12.5 mg Every 4 Hours PRN 5/2/2019 5/2/2019    Sig - Route: Take 1 tablet by mouth Every 4 (Four) Hours As Needed for Nausea or Vomiting. - Oral    Reason for Discontinue: Patient Transfer    Linked Group 4:  \"Or\" Linked Group Details        sodium chloride 0.9 % flush 3 mL (Discontinued) 3 mL Every 12 Hours Scheduled 5/2/2019 5/2/2019    Sig - Route: Infuse 3 mL into a venous catheter Every 12 (Twelve) Hours. - Intravenous    Reason for Discontinue: Patient Transfer    sodium chloride 0.9 % flush 3-10 mL (Discontinued) 3-10 mL As Needed 5/2/2019 5/2/2019    Sig - Route: Infuse 3-10 mL into a venous catheter As Needed for Line Care. - Intravenous    Reason for Discontinue: Patient Transfer          Lab Results (last 72 hours)     Procedure Component Value Units Date/Time    CBC & Differential [[id number]] Collected:  05/03/19 0727    Specimen:  Blood Updated:  05/03/19 0744    Narrative:       The following orders were created for panel order CBC & Differential.  Procedure                               Abnormality         Status                     ---------                               -----------         ------                     CBC Auto Differential[[id number]]        Abnormal            Final result                 Please view results for these tests on the individual orders.    CBC Auto Differential [[id number]]  (Abnormal) Collected:  05/03/19 0727    Specimen:  Blood Updated:  05/03/19 0744     WBC 15.52 10*3/mm3      RBC 3.70 " 10*6/mm3      Hemoglobin 9.5 g/dL      Hematocrit 29.6 %      MCV 80.0 fL      MCH 25.7 pg      MCHC 32.1 g/dL      RDW 14.4 %      RDW-SD 41.5 fl      MPV 9.8 fL      Platelets 258 10*3/mm3      Neutrophil % 76.7 %      Lymphocyte % 14.8 %      Monocyte % 6.3 %      Eosinophil % 0.9 %      Basophil % 0.3 %      Immature Grans % 1.0 %      Neutrophils, Absolute 11.91 10*3/mm3      Lymphocytes, Absolute 2.30 10*3/mm3      Monocytes, Absolute 0.97 10*3/mm3      Eosinophils, Absolute 0.14 10*3/mm3      Basophils, Absolute 0.05 10*3/mm3      Immature Grans, Absolute 0.15 10*3/mm3      nRBC 0.0 /100 WBC     Urine Drug Screen - [[id number]]  (Normal) Collected:  05/02/19 0437    Specimen:  Urine Updated:  05/02/19 0501     Amphet/Methamphet, Screen Negative     Barbiturates Screen, Urine Negative     Benzodiazepine Screen, Urine Negative     Cocaine Screen, Urine Negative     Opiate Screen Negative     THC, Screen, Urine Negative     Methadone Screen, Urine Negative     Oxycodone Screen, Urine Negative    Narrative:       Negative Thresholds For Drugs Screened:     Amphetamines               500 ng/ml   Barbiturates               200 ng/ml   Benzodiazepines            100 ng/ml   Cocaine                    300 ng/ml   Methadone                  300 ng/ml   Opiates                    300 ng/ml   Oxycodone                  100 ng/ml   THC                        50 ng/ml    The Normal Value for all drugs tested is negative. This report includes final unconfirmed screening results to be used for medical treatment purposes only. Unconfirmed results must not be used for non-medical purposes such as employment or legal testing. Clinical consideration should be applied to any drug of abuse test, particulary when unconfirmed results are used.    CBC (No Diff) [[id number]]  (Abnormal) Collected:  05/02/19 0437    Specimen:  Blood Updated:  05/02/19 0447     WBC 10.07 10*3/mm3      RBC 3.55 10*6/mm3      Hemoglobin 9.0 g/dL       Hematocrit 28.4 %      MCV 80.0 fL      MCH 25.4 pg      MCHC 31.7 g/dL      RDW 14.5 %      RDW-SD 41.9 fl      MPV 9.8 fL      Platelets 242 10*3/mm3              Operative/Procedure Notes (last 72 hours) (Notes from 4/30/2019  8:49 AM through 5/3/2019  8:49 AM)      Milan Chew MD at 5/2/2019  4:39 PM          [facility]  Vaginal Delivery Note    Delivery     Delivery: Vaginal, Spontaneous     YOB: 2020    Time of Birth: 1:53 PM      Anesthesia: None     Delivering clinician: Milan Chew       Delivery narrative: Patient progressed second stage of labor developed bradycardia progressed with large amount of encouragement by staff and myself.  On delivery head shoulder dystocia noted.  Did not respond to gentle traction.  Blue maneuver initially tried.  Then suprapubic pressure and Go maneuver applied at same time and there was response to this.  Shoulders came with minimal traction.  Baby was handed off to waiting nursery team.  Segment of cord for gases placenta delivered spontaneously appeared intact.  Baby was moving both arms equally symmetrically and well    Complications  IUGR by ultrasound    Infant    Findings: female  infant     Infant observations: Weight: 3300 g (7 lb 4.4 oz)   Length: 19.5  in  Observations/Comments:         Apgars: 7   @ 1 minute /    9   @ 5 minutes     Placenta, Cord, and Fluid    Placenta delivered  Spontaneous  at   5/2/2019  2:00 PM     Cord: 3 vessels  present.   Nuchal Cord?  yes; Number of nuchal loops present:       Cord blood obtained: Yes    Cord gases obtained:  Yes      Repair    Episiotomy: None    Lacerations: Yes  Laceration Information  Laceration Repaired?   Perineal: None       Periurethral:         Labial:         Sulcus:         Vaginal: No       Cervical: No          Suture used for repair: 2-0 Vicryl   Estimated Blood Loss: Est. Blood Loss (mL): 161 mL(Filed from Delivery Summary) (05/02/19 0514)     Suture used for repair:  2-0 Vicryl and 2-0 Vicryl     Disposition  Mother to Remain in LD  in stable condition currently.  Baby to remains with mom  in stable condition currently.              This document has been electronically signed by Milan Chew MD on May 2, 2019 4:40 PM            Electronically signed by Milan Chew MD at 5/2/2019  4:44 PM          Physician Progress Notes (last 72 hours) (Notes from 4/30/2019  8:49 AM through 5/3/2019  8:49 AM)      Milan Chew MD at 5/2/2019  9:34 AM        Patient 2 to 3 cm about 75% -1 to -2 AROM performed internal lead for electronic fetal heart rate monitoring placed internal pressure catheter placed.  Clinical situation reviewed with patient questions the    Electronically signed by Milan Chew MD at 5/2/2019  9:36 AM

## 2019-05-06 NOTE — PAYOR COMM NOTE
"Herminio Cote (28 y.o. Female)     Date of Birth Social Security Number Address Home Phone MRN    1990  [id number] [facility] [address] [facility] [id number] [phone] [id number]    Mandaen Marital Status          None Single       Admission Date Admission Type Admitting Provider Attending Provider Department, Room/Bed    19 Elective Milan Chew MD  [facility] MOTHER BABY, M756/1    Discharge Date Discharge Disposition Discharge Destination        5/3/2019 Home or Self Care              Attending Provider:  (none)   Allergies:  No Known Allergies    Isolation:  None   Infection:  None   Code Status:  Prior    Ht:  157.5 cm (62\")   Wt:  87.1 kg (192 lb)    Admission Cmt:  None   Principal Problem:  None                Active Insurance as of 2019     Primary Coverage     Payor Plan Insurance Group Employer/Plan Group    [employer] MEDICAID      Payor Plan Address Payor Plan Phone Number Payor Plan Fax Number Effective Dates    [address] [address] [phone]  2016 - None Entered    [place] [id number]       Subscriber Name Subscriber Birth Date Member ID       HERMINIO COTE 1990 [id number]                 Emergency Contacts      (Rel.) Home Phone Work Phone Mobile Phone    Kassi Jimenez (Mother) [phone] -- [phone]    Luisa Prasadew (Significant Other) [phone] -- [phone]                [facility]  (P) [phone]  (F) [phone]      ID# [id number]  Discharged 5/3 to home    Please fax back with case determination.                 Discharge Summary      Milan Chew MD at 5/3/2019 12:54 PM          [facility]  Herminio Cote  : 1990  MRN: [id number]  CSN: [id number]    Discharge Summary:    Date of Admission: 2019  Date of Discharge:  5/3/2019    Admitting Diagnosis:  1. IUP @ 40w0d  2. for induction of labor  3 IUGR     Discharge Diagnosis:  1. S/P "        History and Hospital Course:  Patient is a   who presents for induction of labor.  Her pregnancy was complicated by IUGR.  Please see History and Physical for full details.       She was admitted and progressed in labor with pitocin augmentation to completely dilated. She had a vaginal delivery of a  viable female   infant who weighed 3300 g (7 lb 4.4 oz)  and APGARs of        APGARS  One minute Five minutes Ten minutes Fifteen minutes Twenty minutes   Skin color: 0   1             Heart rate: 2   2             Grimace: 2   2              Muscle tone: 2   2              Breathin   2              Totals: 7   9              . No immediate complications were encountered. Please see procedure note for full details.      Her postpartum course has been unremarkable. She had no signs or symptoms of acute blood loss anemia. She was ambulating well, voiding without difficulty and lochia was within normal limits. She was breast feeding without difficulty. She was stable for discharge on postpartum day 1.      Precautions and instructions were discussed with her including but not limited to maintaining a regular diet at home, practicing local hygiene, pelvic rest, and signs and symptoms to report including heavy bleeding, frequent passage of clots, fainting or dizziness, foul odor of lochia, increasing pain, fever, or any other concerns.    She was asked to follow up in the office in 4 weeks.    Condition: Stable  Discharge Disposition: home  Discharge Diet:   Diet Instructions     Diet: Regular      Discharge Diet:  Regular    Diet: Regular      Discharge Diet:  Regular    Diet: Regular      Discharge Diet:  Regular        Activity at Discharge:   Activity Instructions     Pelvic Rest      Pelvic Rest      Additional Activity Instructions:      Notify Dr fam... heavy bleeding, passing clots, foul odor to your discharge, temperature above 100.4, burning when urinating, gapping or drainage from incision or  "episiotomy, or for pain not relieved by taking pain medication, redness or streaking in breasts, pain or redness in legs.    Take all medications as prescribed.  Continue taking iron or prenatal vitamin while breastfeeding or until you run out.  Take rest periods several times during the day.  \"Baby Blues\" are normal and may be present around the 3rd-4th day after delivery. If they last longer than 2-3 days, please let your Dr know.  Pelvic rest for 6 weeks. No douching, tampons, or intercourse  No driving for 2 weeks  No lifting anything heavier than the baby  Wear a good supportive bra 24 hours/day to prevent engorgement                  Discharge Medications:       Discharge Medications      New Medications      Instructions Start Date   ibuprofen 600 MG tablet  Commonly known as:  ADVIL,MOTRIN   600 mg, Oral, Every 6 Hours PRN         Continue These Medications      Instructions Start Date   ferrous sulfate 325 (65 FE) MG tablet   325 mg, Oral, 2 Times Daily Before Meals      folic acid 1 MG tablet  Commonly known as:  FOLVITE   1 mg, Oral, Daily      Prenatal Vitamin 27-0.8 MG tablet   1 tablet, Oral, Daily           Patient will restart all home medications including prenatal vitamins.        This document has been electronically signed by Milan Chew MD on May 3, 2019 12:55 PM      Electronically signed by Milan Chew MD at 5/3/2019  1:02 PM       "

## 2019-05-10 ENCOUNTER — RESULTS ENCOUNTER (OUTPATIENT)
Dept: OBSTETRICS AND GYNECOLOGY | Facility: CLINIC | Age: 29
End: 2019-05-10

## 2019-05-10 DIAGNOSIS — IMO0002 POOR FETAL GROWTH: ICD-10-CM

## 2019-05-17 ENCOUNTER — RESULTS ENCOUNTER (OUTPATIENT)
Dept: OBSTETRICS AND GYNECOLOGY | Facility: CLINIC | Age: 29
End: 2019-05-17

## 2019-05-17 DIAGNOSIS — O36.5920: ICD-10-CM

## 2019-05-30 ENCOUNTER — OFFICE VISIT (OUTPATIENT)
Dept: OBSTETRICS AND GYNECOLOGY | Facility: CLINIC | Age: 29
End: 2019-05-30

## 2019-05-30 VITALS
WEIGHT: 186 LBS | HEIGHT: 62 IN | HEART RATE: 71 BPM | SYSTOLIC BLOOD PRESSURE: 124 MMHG | BODY MASS INDEX: 34.23 KG/M2 | DIASTOLIC BLOOD PRESSURE: 86 MMHG

## 2019-05-30 PROBLEM — O36.5921 IUGR (INTRAUTERINE GROWTH RESTRICTION) AFFECTING CARE OF MOTHER, SECOND TRIMESTER, FETUS 1: Status: RESOLVED | Noted: 2019-04-28 | Resolved: 2019-05-30

## 2019-05-30 PROBLEM — Z3A.39 39 WEEKS GESTATION OF PREGNANCY: Status: RESOLVED | Noted: 2019-04-28 | Resolved: 2019-05-30

## 2019-05-30 PROBLEM — IMO0002 POOR FETAL GROWTH: Status: RESOLVED | Noted: 2019-01-29 | Resolved: 2019-05-30

## 2019-05-30 PROBLEM — Z3A.38 38 WEEKS GESTATION OF PREGNANCY: Status: RESOLVED | Noted: 2019-04-22 | Resolved: 2019-05-30

## 2019-05-30 PROBLEM — O99.013 ANEMIA DURING PREGNANCY IN THIRD TRIMESTER: Status: RESOLVED | Noted: 2019-03-19 | Resolved: 2019-05-30

## 2019-05-30 PROBLEM — O36.5990 INTRAUTERINE GROWTH RESTRICTION (IUGR) AFFECTING CARE OF MOTHER: Status: RESOLVED | Noted: 2019-05-02 | Resolved: 2019-05-30

## 2019-05-30 PROCEDURE — 99024 POSTOP FOLLOW-UP VISIT: CPT | Performed by: NURSE PRACTITIONER

## 2019-05-30 RX ORDER — ACETAMINOPHEN AND CODEINE PHOSPHATE 120; 12 MG/5ML; MG/5ML
1 SOLUTION ORAL DAILY
Qty: 84 TABLET | Refills: 4 | Status: SHIPPED | OUTPATIENT
Start: 2019-05-30 | End: 2020-02-17

## 2019-05-31 ENCOUNTER — RESULTS ENCOUNTER (OUTPATIENT)
Dept: OBSTETRICS AND GYNECOLOGY | Facility: CLINIC | Age: 29
End: 2019-05-31

## 2019-05-31 DIAGNOSIS — O36.5920: ICD-10-CM

## 2019-05-31 NOTE — PROGRESS NOTES
Subjective   Chief Complaint   Patient presents with   • Postpartum Care     Renetta Cote is a 28 y.o. year old  presenting to be seen for her postpartum visit.  She had a vaginal delivery complicated by shoulder dystocia. .   Prenatal course was been complicated by IUGR, anemia and maternal anxiety disorder..    Since delivery she has not been sexually active.  She does not have concerns about post-partum blues/depression.   She is both breast and bottle feeding due to decreased milk supply.    The following portions of the patient's history were reviewed and updated as appropriate:problem list, current medications, allergies, past medical history, past social history and past surgical history    Social History    Tobacco Use      Smoking status: Former Smoker        Packs/day: 0.25        Years: 1.00        Pack years: .25        Types: Cigarettes        Start date: 2016        Quit date: 2017        Years since quittin.7      Smokeless tobacco: Never Used    Review of Systems   Constitutional: Negative for activity change, appetite change, diaphoresis, fatigue, unexpected weight gain and unexpected weight loss.   Respiratory: Negative for chest tightness and shortness of breath.    Cardiovascular: Negative for chest pain and palpitations.   Gastrointestinal: Negative for abdominal distention, abdominal pain, constipation, diarrhea, nausea and vomiting.   Genitourinary: Positive for vaginal bleeding. Negative for urinary incontinence, decreased libido, dysuria, frequency, menstrual problem, pelvic pain, vaginal discharge, vaginal pain, breast lump and breast pain.        Still having intermittent spotting.     Musculoskeletal: Negative for myalgias.   Skin: Negative for color change, dry skin and skin lesions.   Neurological: Negative for light-headedness and headache.   Psychiatric/Behavioral: Negative for agitation, dysphoric mood, sleep disturbance, depressed mood and stress. The  "patient is not nervous/anxious.         Objective   /86   Pulse 71   Ht 157.5 cm (62\")   Wt 84.4 kg (186 lb)   Breastfeeding? Yes   BMI 34.02 kg/m²     General:  well developed; well nourished  no acute distress  appears stated age  obese - Body mass index is 34.02 kg/m².   Abdomen: soft, non-tender; no masses  no umbilical or inguinal hernias are present  no hepato-splenomegaly  fundus firm and non-tender  Normal findings: bowel sounds normal   Pelvis: Not performed.            Diagnoses and all orders for this visit:    Encounter for  visit    Encounter for care or examination of lactating mother    Other orders  -     norethindrone (MICRONOR) 0.35 MG tablet; Take 1 tablet by mouth Daily.        New Medications Ordered This Visit   Medications   • norethindrone (MICRONOR) 0.35 MG tablet     Sig: Take 1 tablet by mouth Daily.     Dispense:  84 tablet     Refill:  4     Last pap 10/26/16, NIL. Needs pap smear at the time of her 6 week PP visit in 2-3 weeks. Plans to start Micronor on ; R/B/A and potential s/e reviewed.     This note was electronically signed.    Syl Murphy, APRN  May 31, 2019  "

## 2019-06-14 ENCOUNTER — RESULTS ENCOUNTER (OUTPATIENT)
Dept: OBSTETRICS AND GYNECOLOGY | Facility: CLINIC | Age: 29
End: 2019-06-14

## 2019-06-14 DIAGNOSIS — O36.5920: ICD-10-CM

## 2019-06-20 ENCOUNTER — PROCEDURE VISIT (OUTPATIENT)
Dept: OBSTETRICS AND GYNECOLOGY | Facility: CLINIC | Age: 29
End: 2019-06-20

## 2019-06-20 VITALS
BODY MASS INDEX: 34.04 KG/M2 | WEIGHT: 185 LBS | HEIGHT: 62 IN | DIASTOLIC BLOOD PRESSURE: 89 MMHG | SYSTOLIC BLOOD PRESSURE: 130 MMHG

## 2019-06-20 DIAGNOSIS — Z12.4 SCREENING FOR MALIGNANT NEOPLASM OF CERVIX: Primary | ICD-10-CM

## 2019-06-20 PROCEDURE — 87624 HPV HI-RISK TYP POOLED RSLT: CPT | Performed by: OBSTETRICS & GYNECOLOGY

## 2019-06-20 PROCEDURE — 99024 POSTOP FOLLOW-UP VISIT: CPT | Performed by: OBSTETRICS & GYNECOLOGY

## 2019-06-20 PROCEDURE — G0123 SCREEN CERV/VAG THIN LAYER: HCPCS | Performed by: OBSTETRICS & GYNECOLOGY

## 2019-06-22 PROBLEM — Z12.4 SCREENING FOR MALIGNANT NEOPLASM OF CERVIX: Status: ACTIVE | Noted: 2019-06-22

## 2019-06-22 NOTE — PROGRESS NOTES
Renetta Cote is a 28 y.o. y/o female.     Chief Complaint: Postpartum    HPI:   28 y.o. .  No LMP recorded..  She is postpartum doing well requests cytology.  She is on Micronor for contraception.  Doing well with this breast-feeding this going well          Review of Systems   ROS:  CNS: No history of brain malignancy.  HEENT: No history of throat cancer.  Eye: No history of retinal cancer.  Pulmonary: No history of lung cancer.                                                                                 Cardiovascular: No history of cardiac tumors.  Gastrointestinal: No history of small bowel tumors.  Renal: No history of kidney malignancy.  Musculoskeletal: No history of smooth muscle tumors.  Lymphatics: No history of Hodgkin's disease.  Endocrine: No history of thyroid malignancy.    The following portions of the patient's history were reviewed and updated as appropriate: allergies, current medications, past family history, past medical history, past social history, past surgical history and problem list.    No Known Allergies     Outpatient Medications Prior to Visit   Medication Sig Dispense Refill   • ferrous sulfate 325 (65 FE) MG tablet Take 1 tablet by mouth 2 (Two) Times a Day Before Meals. 60 tablet 10   • folic acid (FOLVITE) 1 MG tablet Take 1 mg by mouth Daily.     • norethindrone (MICRONOR) 0.35 MG tablet Take 1 tablet by mouth Daily. 84 tablet 4   • Prenatal Vit-Fe Fumarate-FA (PRENATAL VITAMIN) 27-0.8 MG tablet Take 1 tablet by mouth Daily. 30 tablet 12     Facility-Administered Medications Prior to Visit   Medication Dose Route Frequency Provider Last Rate Last Dose   • cyanocobalamin injection 1,000 mcg  1,000 mcg Intramuscular Q14 Days Danny Page PA-C   1,000 mcg at 18 0900        The patient has a family history of   Family History   Problem Relation Age of Onset   • Diabetes Maternal Aunt    • Hypertension Maternal Aunt    • Heart disease Maternal Grandmother     • Hypertension Maternal Grandmother    • Hypertension Father    • Ovarian cancer Paternal Aunt         Past Medical History:   Diagnosis Date   • Anemia during pregnancy in third trimester 3/19/2019   • Anxiety    • Diarrhea    • Dysmenorrhea    • Dysphagia    • Encounter for gynecological examination without abnormal finding    • Encounter for initial prescription of contraceptive pills    • Encounter for screening for infections with predominantly sexual mode of transmission    • Endometriosis    • Excessive and frequent menstruation with regular cycle    • Generalized abdominal pain    • GERD (gastroesophageal reflux disease)    • Gestational hypertension    • Hypertension    • IBS (irritable bowel syndrome)    • Poor fetal growth 2019   • Secondary dysmenorrhea    • Stricture of esophagus    • Varicella    • Vitamin D deficiency     Deficiency of vitamin D2 - cont supp, may take 5000U daily           OB History      Para Term  AB Living    2 2 2     2    SAB TAB Ectopic Molar Multiple Live Births            0 2           Social History     Socioeconomic History   • Marital status: Single     Spouse name: Not on file   • Number of children: Not on file   • Years of education: Not on file   • Highest education level: Not on file   Tobacco Use   • Smoking status: Former Smoker     Packs/day: 0.25     Years: 1.00     Pack years: 0.25     Types: Cigarettes     Start date: 2016     Last attempt to quit: 2017     Years since quittin.8   • Smokeless tobacco: Never Used   Substance and Sexual Activity   • Alcohol use: No   • Drug use: No   • Sexual activity: Yes     Partners: Male     Birth control/protection: None        Past Surgical History:   Procedure Laterality Date   • CHOLECYSTECTOMY      Laparoscopic cholecystectomy with operative cholangiogram. Right upper quadrant pain. 10/12/2006       • COLONOSCOPY  10/17/2011   • ENDOSCOPY      Colitis in terminal ileum. Biopsy taken.  "10/17/2011       • ENDOSCOPY      Esophageal stricture was present. Dilatation was performed. Esophagitis seen. Biopsy taken. Gastritis was found in the stomach. Biopsy taken. Normal duodenum. 01/12/2015      • ENDOSCOPY      The stomach appeared mike. A single biopsy was obtained and placed on PyloriTek strip. Normal EGD (45.13) 01/05/2007       • EYE SURGERY      Bilateral medial rectus recession. Entropion. 10/31/1994       • OTHER SURGICAL HISTORY      TUBE IMPLANT GENERAL ANESTHETIC 55791 (1)    Bilateral myringototomy with tube insertions and adenoidectomy. 04/18/2001       • PELVIC LAPAROSCOPY  2000    dx endometriosis   • UPPER GASTROINTESTINAL ENDOSCOPY  01/12/2015        Patient Active Problem List   Diagnosis   • Esophageal reflux   • IBS (irritable bowel syndrome)   • Vitamin D deficiency   • Endometriosis   • Chronic tension headaches   • Generalized anxiety disorder   • Encounter for care or examination of lactating mother   • Postpartum state   • Screening for malignant neoplasm of cervix        Documented Vitals    06/20/19 1008   BP: 130/89   Weight: 83.9 kg (185 lb)   Height: 157.5 cm (62\")        Body mass index is 33.84 kg/m².    Physical Exam  Constitutional: Appears to be in no acute distress; Eyes: sclera normal; Endocrine system: thyroid palpate is normal; Pulmonary system: lungs clear; Cardiovascular system: heart regular rate and rhythm; Gastrointestinal system: abdomen soft nontender, active bowel sounds; Urologic system: CVA negative; Psychiatric: appropriate insight; Neurologic: gait within normal limits female genital system external genitalia appears normal and well-healed postpartum vagina normal cervix appears normal postpartum Pap smear obtained    Assessment        Diagnosis Plan   1. Screening for malignant neoplasm of cervix  Liquid-based Pap Smear, Screening   2. Postpartum state           Plan       No orders of the defined types were placed in this encounter.          This " document has been electronically signed by Milan Chew MD on June 22, 2019 5:39 PM

## 2019-06-24 LAB
GEN CATEG CVX/VAG CYTO-IMP: NORMAL
LAB AP CASE REPORT: NORMAL
LAB AP GYN ADDITIONAL INFORMATION: NORMAL
PATH INTERP SPEC-IMP: NORMAL
STAT OF ADQ CVX/VAG CYTO-IMP: NORMAL

## 2019-06-26 LAB — HPV I/H RISK 4 DNA CVX QL PROBE+SIG AMP: NEGATIVE

## 2019-06-28 ENCOUNTER — RESULTS ENCOUNTER (OUTPATIENT)
Dept: OBSTETRICS AND GYNECOLOGY | Facility: CLINIC | Age: 29
End: 2019-06-28

## 2019-06-28 DIAGNOSIS — O36.5920: ICD-10-CM

## 2019-07-12 ENCOUNTER — RESULTS ENCOUNTER (OUTPATIENT)
Dept: OBSTETRICS AND GYNECOLOGY | Facility: CLINIC | Age: 29
End: 2019-07-12

## 2019-07-12 DIAGNOSIS — O36.5920: ICD-10-CM

## 2019-07-26 ENCOUNTER — RESULTS ENCOUNTER (OUTPATIENT)
Dept: OBSTETRICS AND GYNECOLOGY | Facility: CLINIC | Age: 29
End: 2019-07-26

## 2019-07-26 DIAGNOSIS — O36.5920: ICD-10-CM

## 2019-08-09 ENCOUNTER — RESULTS ENCOUNTER (OUTPATIENT)
Dept: OBSTETRICS AND GYNECOLOGY | Facility: CLINIC | Age: 29
End: 2019-08-09

## 2019-08-09 DIAGNOSIS — O36.5920: ICD-10-CM

## 2021-04-20 ENCOUNTER — OFFICE VISIT (OUTPATIENT)
Dept: FAMILY MEDICINE CLINIC | Facility: CLINIC | Age: 31
End: 2021-04-20

## 2021-04-20 ENCOUNTER — LAB (OUTPATIENT)
Dept: LAB | Facility: HOSPITAL | Age: 31
End: 2021-04-20

## 2021-04-20 VITALS
WEIGHT: 154 LBS | DIASTOLIC BLOOD PRESSURE: 78 MMHG | HEIGHT: 62 IN | SYSTOLIC BLOOD PRESSURE: 104 MMHG | HEART RATE: 87 BPM | BODY MASS INDEX: 28.34 KG/M2 | OXYGEN SATURATION: 100 %

## 2021-04-20 DIAGNOSIS — R20.2 PARESTHESIA OF RIGHT ARM: Primary | ICD-10-CM

## 2021-04-20 DIAGNOSIS — K58.8 OTHER IRRITABLE BOWEL SYNDROME: ICD-10-CM

## 2021-04-20 DIAGNOSIS — M53.3 COCCYX PAIN: ICD-10-CM

## 2021-04-20 DIAGNOSIS — Z76.89 ENCOUNTER TO ESTABLISH CARE: ICD-10-CM

## 2021-04-20 DIAGNOSIS — R51.9 FREQUENT HEADACHES: ICD-10-CM

## 2021-04-20 DIAGNOSIS — F41.1 GENERALIZED ANXIETY DISORDER: ICD-10-CM

## 2021-04-20 DIAGNOSIS — R20.2 PARESTHESIA OF RIGHT ARM: ICD-10-CM

## 2021-04-20 LAB
25(OH)D3 SERPL-MCNC: 14.2 NG/ML
ALBUMIN SERPL-MCNC: 4.4 G/DL (ref 3.5–5.2)
ALBUMIN/GLOB SERPL: 1.4 G/DL
ALP SERPL-CCNC: 83 U/L (ref 39–117)
ALT SERPL W P-5'-P-CCNC: 10 U/L (ref 1–33)
ANION GAP SERPL CALCULATED.3IONS-SCNC: 9.2 MMOL/L (ref 5–15)
AST SERPL-CCNC: 13 U/L (ref 1–32)
BASOPHILS # BLD AUTO: 0.04 10*3/MM3 (ref 0–0.2)
BASOPHILS NFR BLD AUTO: 0.7 % (ref 0–1.5)
BILIRUB SERPL-MCNC: 0.3 MG/DL (ref 0–1.2)
BUN SERPL-MCNC: 12 MG/DL (ref 6–20)
BUN/CREAT SERPL: 16.7 (ref 7–25)
CALCIUM SPEC-SCNC: 9.1 MG/DL (ref 8.6–10.5)
CHLORIDE SERPL-SCNC: 105 MMOL/L (ref 98–107)
CO2 SERPL-SCNC: 27.8 MMOL/L (ref 22–29)
CREAT SERPL-MCNC: 0.72 MG/DL (ref 0.57–1)
DEPRECATED RDW RBC AUTO: 38.8 FL (ref 37–54)
EOSINOPHIL # BLD AUTO: 0.12 10*3/MM3 (ref 0–0.4)
EOSINOPHIL NFR BLD AUTO: 2.1 % (ref 0.3–6.2)
ERYTHROCYTE [DISTWIDTH] IN BLOOD BY AUTOMATED COUNT: 13.4 % (ref 12.3–15.4)
FERRITIN SERPL-MCNC: 17.4 NG/ML (ref 13–150)
GFR SERPL CREATININE-BSD FRML MDRD: 94 ML/MIN/1.73
GLOBULIN UR ELPH-MCNC: 3.1 GM/DL
GLUCOSE SERPL-MCNC: 94 MG/DL (ref 65–99)
HBA1C MFR BLD: 5.31 % (ref 4.8–5.6)
HCT VFR BLD AUTO: 36.6 % (ref 34–46.6)
HGB BLD-MCNC: 12.6 G/DL (ref 12–15.9)
IMM GRANULOCYTES # BLD AUTO: 0.03 10*3/MM3 (ref 0–0.05)
IMM GRANULOCYTES NFR BLD AUTO: 0.5 % (ref 0–0.5)
IRON 24H UR-MRATE: 57 MCG/DL (ref 37–145)
IRON SATN MFR SERPL: 12 % (ref 20–50)
LYMPHOCYTES # BLD AUTO: 1.41 10*3/MM3 (ref 0.7–3.1)
LYMPHOCYTES NFR BLD AUTO: 24.4 % (ref 19.6–45.3)
MCH RBC QN AUTO: 28.1 PG (ref 26.6–33)
MCHC RBC AUTO-ENTMCNC: 34.4 G/DL (ref 31.5–35.7)
MCV RBC AUTO: 81.5 FL (ref 79–97)
MONOCYTES # BLD AUTO: 0.4 10*3/MM3 (ref 0.1–0.9)
MONOCYTES NFR BLD AUTO: 6.9 % (ref 5–12)
NEUTROPHILS NFR BLD AUTO: 3.77 10*3/MM3 (ref 1.7–7)
NEUTROPHILS NFR BLD AUTO: 65.4 % (ref 42.7–76)
NRBC BLD AUTO-RTO: 0 /100 WBC (ref 0–0.2)
PLATELET # BLD AUTO: 260 10*3/MM3 (ref 140–450)
PMV BLD AUTO: 10.4 FL (ref 6–12)
POTASSIUM SERPL-SCNC: 5.2 MMOL/L (ref 3.5–5.2)
PROT SERPL-MCNC: 7.5 G/DL (ref 6–8.5)
RBC # BLD AUTO: 4.49 10*6/MM3 (ref 3.77–5.28)
SODIUM SERPL-SCNC: 142 MMOL/L (ref 136–145)
T4 FREE SERPL-MCNC: 1.02 NG/DL (ref 0.93–1.7)
TIBC SERPL-MCNC: 478 MCG/DL (ref 298–536)
TRANSFERRIN SERPL-MCNC: 321 MG/DL (ref 200–360)
TSH SERPL DL<=0.05 MIU/L-ACNC: 2.66 UIU/ML (ref 0.27–4.2)
VIT B12 BLD-MCNC: 297 PG/ML (ref 211–946)
WBC # BLD AUTO: 5.77 10*3/MM3 (ref 3.4–10.8)

## 2021-04-20 PROCEDURE — 82728 ASSAY OF FERRITIN: CPT

## 2021-04-20 PROCEDURE — 84439 ASSAY OF FREE THYROXINE: CPT

## 2021-04-20 PROCEDURE — 99214 OFFICE O/P EST MOD 30 MIN: CPT | Performed by: FAMILY MEDICINE

## 2021-04-20 PROCEDURE — 82607 VITAMIN B-12: CPT

## 2021-04-20 PROCEDURE — 36415 COLL VENOUS BLD VENIPUNCTURE: CPT

## 2021-04-20 PROCEDURE — 84466 ASSAY OF TRANSFERRIN: CPT

## 2021-04-20 PROCEDURE — 83540 ASSAY OF IRON: CPT

## 2021-04-20 PROCEDURE — 82306 VITAMIN D 25 HYDROXY: CPT

## 2021-04-20 PROCEDURE — 80050 GENERAL HEALTH PANEL: CPT

## 2021-04-20 PROCEDURE — 83036 HEMOGLOBIN GLYCOSYLATED A1C: CPT

## 2021-04-20 RX ORDER — PHENTERMINE HYDROCHLORIDE 37.5 MG/1
37.5 CAPSULE ORAL EVERY MORNING
COMMUNITY
End: 2021-07-20
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
## 2021-04-21 ENCOUNTER — TELEPHONE (OUTPATIENT)
Dept: FAMILY MEDICINE CLINIC | Facility: CLINIC | Age: 31
End: 2021-04-21

## 2021-04-21 DIAGNOSIS — M53.3 COCCYX PAIN: Primary | ICD-10-CM

## 2021-04-21 RX ORDER — ERGOCALCIFEROL 1.25 MG/1
50000 CAPSULE ORAL WEEKLY
Qty: 4 CAPSULE | Refills: 1 | Status: SHIPPED | OUTPATIENT
Start: 2021-04-21 | End: 2021-07-16

## 2021-04-21 RX ORDER — LANOLIN ALCOHOL/MO/W.PET/CERES
1000 CREAM (GRAM) TOPICAL DAILY
Qty: 30 TABLET | Refills: 1 | Status: SHIPPED | OUTPATIENT
Start: 2021-04-21 | End: 2021-07-19

## 2021-04-21 NOTE — TELEPHONE ENCOUNTER
Dr. Bucio, Ms. Cote called back and is requesting a CT since her x-ray was Normal and did not show any abnormalities.     Please Advise  INDIA Adame

## 2021-04-21 NOTE — TELEPHONE ENCOUNTER
Per Dr. Bucio, Ms. Cote has been called with recent lab results and recommendations.  Continue current medications and follow-up as planned or sooner if any problems.

## 2021-04-21 NOTE — TELEPHONE ENCOUNTER
Please call and let patient know the following results:    - Vitamin B12 low normal.  Will send supplement to the pharmacy.  - Vitamin D low at 14, normal > 30.  Will send 50,000 units weekly for 8 weeks.   - TSH ok  - CBC ok.  No anemia, but iron is low.  Will treat with iron supplement daily for 4-8 weeks.   - CMP is normal  - HgbA1c for screening prediabetes/diabetes normal  - No fracture or dislocation of tailbone on Xray      DANNY Lopez

## 2021-05-05 ENCOUNTER — TRANSCRIBE ORDERS (OUTPATIENT)
Dept: GENERAL RADIOLOGY | Facility: HOSPITAL | Age: 31
End: 2021-05-05

## 2021-05-05 DIAGNOSIS — M53.3 COCCYX PAIN: Primary | ICD-10-CM

## 2021-05-11 ENCOUNTER — APPOINTMENT (OUTPATIENT)
Dept: CT IMAGING | Facility: HOSPITAL | Age: 31
End: 2021-05-11

## 2021-05-18 ENCOUNTER — APPOINTMENT (OUTPATIENT)
Dept: CT IMAGING | Facility: HOSPITAL | Age: 31
End: 2021-05-18

## 2021-06-25 ENCOUNTER — OFFICE VISIT (OUTPATIENT)
Dept: FAMILY MEDICINE CLINIC | Facility: CLINIC | Age: 31
End: 2021-06-25

## 2021-06-25 VITALS
HEIGHT: 62 IN | HEART RATE: 89 BPM | BODY MASS INDEX: 28.34 KG/M2 | WEIGHT: 154 LBS | DIASTOLIC BLOOD PRESSURE: 74 MMHG | OXYGEN SATURATION: 98 % | SYSTOLIC BLOOD PRESSURE: 100 MMHG

## 2021-06-25 DIAGNOSIS — E55.9 VITAMIN D DEFICIENCY: ICD-10-CM

## 2021-06-25 DIAGNOSIS — E53.8 VITAMIN B12 DEFICIENCY: ICD-10-CM

## 2021-06-25 DIAGNOSIS — M53.3 COCCYX PAIN: Primary | ICD-10-CM

## 2021-06-25 DIAGNOSIS — E61.1 IRON DEFICIENCY: ICD-10-CM

## 2021-06-25 PROCEDURE — 99213 OFFICE O/P EST LOW 20 MIN: CPT | Performed by: FAMILY MEDICINE

## 2021-07-08 ENCOUNTER — APPOINTMENT (OUTPATIENT)
Dept: PHYSICAL THERAPY | Facility: HOSPITAL | Age: 31
End: 2021-07-08

## 2021-07-15 ENCOUNTER — HOSPITAL ENCOUNTER (OUTPATIENT)
Dept: PHYSICAL THERAPY | Facility: HOSPITAL | Age: 31
Setting detail: THERAPIES SERIES
Discharge: HOME | End: 2021-07-15

## 2021-07-15 DIAGNOSIS — M53.3 COCCYX PAIN: Primary | ICD-10-CM

## 2021-07-15 PROCEDURE — 97162 PT EVAL MOD COMPLEX 30 MIN: CPT | Performed by: PHYSICAL THERAPIST

## 2021-07-15 NOTE — THERAPY EVALUATION
Outpatient Physical Therapy Pelvic Health Initial Evaluation  [facility]     Patient Name: Renetta Cote  : 1990  MRN: [id number]  Today's Date: 7/15/2021        Visit Date: 07/15/2021   Visit number:   Recheck: 21  Insurance: [employer] (needs authorized)      Patient Active Problem List   Diagnosis   • Esophageal reflux   • IBS (irritable bowel syndrome)   • Vitamin D deficiency   • Endometriosis   • Chronic tension headaches   • Generalized anxiety disorder   • Encounter for care or examination of lactating mother   • Postpartum state   • Screening for malignant neoplasm of cervix        Past Medical History:   Diagnosis Date   • Anemia during pregnancy in third trimester 3/19/2019   • Anxiety    • Diarrhea    • Dysmenorrhea    • Dysphagia    • Encounter for gynecological examination without abnormal finding    • Encounter for initial prescription of contraceptive pills    • Encounter for screening for infections with predominantly sexual mode of transmission    • Endometriosis    • Excessive and frequent menstruation with regular cycle    • Generalized abdominal pain    • GERD (gastroesophageal reflux disease)    • Gestational hypertension    • Hypertension    • IBS (irritable bowel syndrome)    • Poor fetal growth 2019   • Secondary dysmenorrhea    • Stricture of esophagus    • Varicella    • Vitamin D deficiency     Deficiency of vitamin D2 - cont supp, may take 5000U daily           Past Surgical History:   Procedure Laterality Date   • CHOLECYSTECTOMY      Laparoscopic cholecystectomy with operative cholangiogram. Right upper quadrant pain. 10/12/2006       • COLONOSCOPY  10/17/2011   • ENDOSCOPY      Colitis in terminal ileum. Biopsy taken. 10/17/2011       • ENDOSCOPY      Esophageal stricture was present. Dilatation was performed. Esophagitis seen. Biopsy taken. Gastritis was found in the stomach. Biopsy taken. Normal duodenum. 2015      • ENDOSCOPY      The  stomach appeared mike. A single biopsy was obtained and placed on PyloriTek strip. Normal EGD (45.13) 01/05/2007       • EYE SURGERY      Bilateral medial rectus recession. Entropion. 10/31/1994       • OTHER SURGICAL HISTORY      TUBE IMPLANT GENERAL ANESTHETIC 48339 (1)    Bilateral myringototomy with tube insertions and adenoidectomy. 04/18/2001       • PELVIC LAPAROSCOPY  2000    dx endometriosis   • UPPER GASTROINTESTINAL ENDOSCOPY  01/12/2015     No Known Allergies  Current Outpatient Medications on File Prior to Encounter   Medication Sig Dispense Refill   • Iron, Ferrous Sulfate, 325 (65 Fe) MG tablet Take 325 mg by mouth Daily With Breakfast. 30 tablet 1   • phentermine 37.5 MG capsule Take 37.5 mg by mouth Every Morning.     • vitamin B-12 (CYANOCOBALAMIN) 1000 MCG tablet Take 1 tablet by mouth Daily. 30 tablet 1   • vitamin D (ERGOCALCIFEROL) 1.25 MG (85315 UT) capsule capsule Take 1 capsule by mouth 1 (One) Time Per Week. 4 capsule 1     No current facility-administered medications on file prior to encounter.         Visit Dx:    ICD-10-CM ICD-9-CM   1. Coccyx pain  M53.3 724.79           Pelvic Health     Row Name 07/15/21 1000             Subjective Comments    Subjective Comments  last year my little brother kneed me in the butt while bent over in chair.  Xray showed negative.  Pushed for CT scan but insurance denied it and wanted PT first.  Can't sit flat on tailbone.  No issues with constipation.  Normally hurts with hard and soft surface both.  Hard usually more than soft surface.  Localized to directly around tailbone and feels the tailbone is curved to the left.  No pain with defecation.  Pain with intercourse reported since onset.  Feels sharp and stabbing in nature.  Same partner and reports having had intercourse with same partner without pain.  Notes that she is always side sitting to avoid pain.  Can tolerate side sit on both sides equally.  No trouble with sleeping.  Patient later reports  having a fall about 3  years prior on ice, falling on the porch.  Arthritis in spine was noted on radiographs.    -SW         Pregnancy Questions    Number of Pregnancies  2  -SW      Number of Miscarriages  0  -SW      Number of Children  2 11 and 1 yo   -SW      Type of Previous Deliveries  Vaginal  -SW         Pain Assessment    Pain Assessment  0-10  -SW      Pain Score  4 most days average.   -SW        User Key  (r) = Recorded By, (t) = Taken By, (c) = Cosigned By    Initials Name Provider Type    Ambika Melendez, PT DPT Physical Therapist        PT Ortho     Row Name 07/15/21 1000       Subjective Pain    Pre-Treatment Pain Level  4  -SW    Post-Treatment Pain Level  4  -SW       Posture/Observations    Posture- WNL  Posture is WNL  -SW    Alignment Options  Thoracic kyphosis mild  -SW    Thoracic Kyphosis  Mild  -SW    Lumbar lordosis  Mild  -SW    Posture/Observations Comments  L IC elevated in standing. L Shoulder elevated.  L side long in supine. ASIS down on L.    -SW       Quarter Clearing    Quarter Clearing  Lower Quarter Clearing  -SW       DTR- Lower Quarter Clearing    Patellar tendon (L2-4)  2- Normal response  -SW    Achilles tendon (S1-2)  2- Normal response  -SW       Neural Tension Signs- Lower Quarter Clearing    Slump  Negative  -SW    SLR  Negative  -SW       Sensory Screen for Light Touch- Lower Quarter Clearing    L1 (inguinal area)  Intact  -SW    L2 (anterior mid thigh)  Intact  -SW    L3 (distal anterior thigh)  Intact  -SW    L4 (medial lower leg/foot)  Intact  -SW    L5 (lateral lower leg/great toe)  Intact  -SW    S1 (bottom of foot)  Intact  -SW       Myotomal Screen- Lower Quarter Clearing    Hip flexion (L2)  5 (Normal)  -SW    Knee extension (L3)  5 (Normal)  -SW    Knee flexion (S2)  5 (Normal)  -SW       Lumbar ROM Screen- Lower Quarter Clearing    Lumbar Flexion  Normal  -SW    Lumbar Extension  Normal  -SW    Lumbar Lateral Flexion  Normal  -SW    Lumbar Rotation   Normal  -SW       SI/Hip Screen- Lower Quarter Clearing    ASIS compression  Negative  -SW    ASIS distraction  Negative  -SW    Diana's/Magdi's test  Right:;Positive  -SW       Special Tests/Palpation    Special Tests/Palpation  Lumbar/SI  -SW       Lumbosacral Accessory Motions    Lumbosacral Accessory Motions Tested?  Yes  -SW    PA Glide- L1  Hypomobile  -SW    PA Glide- L2  Hypomobile  -SW    PA Glide- L3  Hypomobile  -SW    PA Glide- L4  Hypomobile  -SW    PA Glide- L5  Hypomobile  -SW    PA glide- Sacral base  -- R on L sacral torsion   -SW    Innominate rotation  -- L ant innom   -SW       Lumbar/SI Special Tests    Seated Flexion Test (SI Dysfunction)  Negative  -SW    Slump Test (Neural Tension)  Negative  -SW    SLR (Neural Tension)  Negative  -SW    SI Compression Test (SI Dysfunction)  Negative  -SW    SI Distraction Test (SI Dysfunction)  Negative  -SW    DIANA (hip vs. SI Dysfunction)  Right:;Positive  -SW    Sacral Spring Test (SI Dysfunction)  -- reduced all planes  -SW    Lumbar/SI Special Tests Comments  Lumbosacral AP glides reduced throughout.  Presents with upslip on R ilium and coccyx SB toward the L.  Negative neurtension noted.   -SW       Lumbosacral Palpation    Lumbosacral Palpation?  Yes  -SW    SI  Bilateral:;Tender;Guarded/taut  -SW    Lumbosacral Segment  Bilateral:;Guarded/taut  -SW    Piriformis  Bilateral:;Guarded/taut R>L tension  -SW    Coccyx  -- sidebent toward the L; negative ttp to coccygeus  -SW    Lumbosacral Palpation Comments  Patient presents with lumbosacral dysfunction with SB coccyx to L.    -SW       General ROM    GENERAL ROM COMMENTS  functional   -SW       MMT (Manual Muscle Testing)    General MMT Comments  5/5 throughout  -SW       Balance Skills Training    Balance Comments  normal static and dynamic balance  -SW       Transfers    Comment (Transfers)  transfers with I but without log support until instructed  -SW       Gait/Stairs (Locomotion)    Comment  "(Gait/Stairs)  equal step and stride noted bilaterally.  Non antalgic gait detected.   -      User Key  (r) = Recorded By, (t) = Taken By, (c) = Cosigned By    Initials Name Provider Type    Ambika Melendez PT DPT Physical Therapist                     PT Assessment/Plan     Row Name 07/15/21 1300          PT Assessment    Functional Limitations  Limitation in home management;Limitations in community activities;Performance in leisure activities;Performance in self-care ADL  -SW     Impairments  Impaired muscle length;Impaired postural alignment;Joint mobility;Pain;Poor body mechanics;Posture  -SW     Assessment Comments  Patient is a 32 yo female presenting with c/o of coccyx pain.  She had a fall onto ice x 3 years prior followed by sibling kneeing her in buttock about 1 year ago that has lead to dysfunction.  Her medical condition is complicated by history of \"arthritis\" and dec  AP glides throughout LS and sacral region.  Pt presents with iliosacral dysfunction with Ant innom on L side along with sacral torsion R on L creating pain.  Coccyx position is sidebent to L also creating dec tolerance to seated posture.  Pt would benefit from skilled therapy to address function, alignment and pain.   -     Rehab Potential  Good  -     Patient/caregiver participated in establishment of treatment plan and goals  Yes  -SW     Patient would benefit from skilled therapy intervention  Yes  -SW        PT Plan    PT Frequency  1x/week  -     PT Plan Comments  Manual therapy for alignment, ther ex for strength and stabilization, modalities as needed for muscle relaxation and pain as needed.   -       User Key  (r) = Recorded By, (t) = Taken By, (c) = Cosigned By    Initials Name Provider Type    Ambika Melendez PT DPT Physical Therapist            OP Exercises     Row Name 07/15/21 1000             Subjective Comments    Subjective Comments  last year my little brother kneed me in the butt while bent " over in chair.  Xray showed negative.  Pushed for CT scan but insurance denied it and wanted PT first.  Can't sit flat on tailbone.  No issues with constipation.  Normally hurts with hard and soft surface both.  Hard usually more than soft surface.  Localized to directly around tailbone and feels the tailbone is curved to the left.  No pain with defecation.  Pain with intercourse reported since onset.  Feels sharp and stabbing in nature.  Same partner and reports having had intercourse with same partner without pain.  Notes that she is always side sitting to avoid pain.  Can tolerate side sit on both sides equally.  No trouble with sleeping.  Patient later reports having a fall about 3  years prior on ice, falling on the porch.  Arthritis in spine was noted on radiographs.    -SW         Subjective Pain    Pre-Treatment Pain Level  4  -SW      Post-Treatment Pain Level  4  -SW         Exercise 1    Exercise Name 1  piriformis stretch bilaterally   -SW      Reps 1  3  -SW      Time 1  30 sec  -SW         Exercise 2    Exercise Name 2  SKTC on LLE  -SW      Reps 2  3  -SW      Time 2  30 sec  -SW         Exercise 3    Exercise Name 3  unilateral bridge on R  -SW      Reps 3  10  -SW      Time 3  5 sec  -SW         Exercise 4    Exercise Name 4  towel roll sit posture.   -SW      Additional Comments  encouraged release around coccyx with support sitting as needed for pressure relief.   -SW        User Key  (r) = Recorded By, (t) = Taken By, (c) = Cosigned By    Initials Name Provider Type    Ambika Melendez PT DPT Physical Therapist           Manual Rx (last 36 hours)      Manual Treatments     Row Name 07/15/21 1300             Manual Rx 1    Manual Rx 1 Location  iliosacral dysfunction   -SW      Manual Rx 1 Type  sacral torsion MET on R sidelying and ant innom on L   -SW        User Key  (r) = Recorded By, (t) = Taken By, (c) = Cosigned By    Initials Name Provider Type    Ambika Melendez PT DPT  Physical Therapist                    PT OP Goals     Row Name 07/15/21 1300          PT Short Term Goals    STG Date to Achieve  08/12/21  -     STG 1  patient to be independent with HEP and comply with daily completion   -     STG 1 Progress  New  -     STG 2  Patient to present with innom  symmetry in supine position to not require MET for corrections.   -SW     STG 2 Progress  New  -     STG 3  patient to improve length to piriformis on R side to allow CHANDANA without + indicative findings.  -SW     STG 3 Progress  New  -     STG 4  patient to report pain reduction of 3/10 or less than evaluation of 4/10 consistently.   -     STG 4 Progress  New  -     STG 5  subjective improvement of 50% better overall since induction of therapy.   -     STG 5 Progress  New  -        Long Term Goals    LTG Date to Achieve  10/29/21  -     LTG 1  patient to tolerate seated posture x 1.5 hours to allow movie watch without inc pain or transition required.   -     LTG 1 Progress  New  -     LTG 2  pain to be mild 2/10 or less without required Tylenol or ice use.   -     LTG 2 Progress  New  -     LTG 3  patient to resume normal daily activities at 70-80% without restrictions.   -     LTG 3 Progress  New  -     LTG 4  sacral position to be improved without MET required.   -     LTG 4 Progress  New  -        Time Calculation    PT Goal Re-Cert Due Date  08/12/21  -       User Key  (r) = Recorded By, (t) = Taken By, (c) = Cosigned By    Initials Name Provider Type    Ambika Melendez, PT DPT Physical Therapist          Therapy Education  Given: HEP, Posture/body mechanics  Program: New  How Provided: Verbal, Demonstration, Written  Provided to: Patient  Level of Understanding: Teach back education performed, Verbalized, Demonstrated               Time Calculation:   Start Time: 1025  Stop Time: 1132  Time Calculation (min): 67 min  Therapy Charges for Today     Code Description Service Date  Service Provider Modifiers Qty    12171373356 HC PT EVAL MOD COMPLEXITY 4 7/15/2021 Ambika Garcia, PT DPT GP 1                  Ambika Garcia, PT DPT  7/15/2021

## 2021-07-16 RX ORDER — ERGOCALCIFEROL 1.25 MG/1
50000 CAPSULE ORAL WEEKLY
Qty: 4 CAPSULE | Refills: 1 | Status: SHIPPED | OUTPATIENT
Start: 2021-07-16 | End: 2021-09-10

## 2021-07-19 RX ORDER — OMEGA-3/DHA/EPA/FISH OIL 35-113.5MG
TABLET,CHEWABLE ORAL
Qty: 30 TABLET | Refills: 1 | Status: SHIPPED | OUTPATIENT
Start: 2021-07-19 | End: 2021-09-10

## 2021-07-19 RX ORDER — FERROUS SULFATE 325(65) MG
TABLET ORAL
Qty: 30 TABLET | Refills: 1 | Status: SHIPPED | OUTPATIENT
Start: 2021-07-19 | End: 2021-09-10

## 2021-07-20 PROCEDURE — 87635 SARS-COV-2 COVID-19 AMP PRB: CPT | Performed by: NURSE PRACTITIONER

## 2021-07-22 ENCOUNTER — APPOINTMENT (OUTPATIENT)
Dept: PHYSICAL THERAPY | Facility: HOSPITAL | Age: 31
End: 2021-07-22

## 2021-08-05 ENCOUNTER — APPOINTMENT (OUTPATIENT)
Dept: PHYSICAL THERAPY | Facility: HOSPITAL | Age: 31
End: 2021-08-05

## 2021-08-09 ENCOUNTER — APPOINTMENT (OUTPATIENT)
Dept: PHYSICAL THERAPY | Facility: HOSPITAL | Age: 31
End: 2021-08-09

## 2021-08-17 ENCOUNTER — HOSPITAL ENCOUNTER (OUTPATIENT)
Dept: PHYSICAL THERAPY | Facility: HOSPITAL | Age: 31
Setting detail: THERAPIES SERIES
Discharge: HOME | End: 2021-08-17

## 2021-08-17 DIAGNOSIS — M53.3 COCCYX PAIN: Primary | ICD-10-CM

## 2021-08-17 PROCEDURE — 97110 THERAPEUTIC EXERCISES: CPT | Performed by: PHYSICAL THERAPIST

## 2021-08-17 PROCEDURE — 97140 MANUAL THERAPY 1/> REGIONS: CPT | Performed by: PHYSICAL THERAPIST

## 2021-08-17 NOTE — THERAPY TREATMENT NOTE
Outpatient Physical Therapy Pelvic Health Treatment Note  [facility]     Patient Name: Renetta Cote  : 1990  MRN: [id number]  Today's Date: 2021        Visit Date: 2021   Visit number:   Recheck: 21  Insurance: [employer] (needs extension of dates)  Improvement: 25%      Visit Dx:    ICD-10-CM ICD-9-CM   1. Coccyx pain  M53.3 724.79       Patient Active Problem List   Diagnosis   • Esophageal reflux   • IBS (irritable bowel syndrome)   • Vitamin D deficiency   • Endometriosis   • Chronic tension headaches   • Generalized anxiety disorder   • Encounter for care or examination of lactating mother   • Postpartum state   • Screening for malignant neoplasm of cervix        Pelvic Health     Row Name 21 0900             Subjective Comments    Subjective Comments  Has been out with quarantine.  Tailbone still painful.  When seated for too long period, notes pain is worse.  Felt good after first session, but then right back again.  No pain with defecation, urination or intercourse.  Just hurts to sit. Still doing the stretches.    -SW         Pregnancy Questions    Number of Pregnancies  2  -SW      Number of Miscarriages  0  -SW      Number of Children  2 11 and 3 yo   -SW      Type of Previous Deliveries  Vaginal  -SW         Pain Assessment    Pain Assessment  0-10  -SW      Pain Score  5  -SW      Post Pain Score  3  -SW         Pelvic Floor Muscle    External Pelvic Floor Comments  obt internus ttp bilaterally; coccygeus ttp bilaterally; sacrotuberous ligament + bilaterally.    -SW        User Key  (r) = Recorded By, (t) = Taken By, (c) = Cosigned By    Initials Name Provider Type    Ambika Melendez, PT DPT Physical Therapist        PT Ortho     Row Name 21 0900       Subjective Pain    Able to rate subjective pain?  yes  -SW    Pre-Treatment Pain Level  5  -SW    Post-Treatment Pain Level  3  -SW       Posture/Observations    Posture- WNL  Posture is WNL   -SW    Alignment Options  Thoracic kyphosis mild  -SW    Thoracic Kyphosis  Mild  -SW    Lumbar lordosis  Mild  -SW    Posture/Observations Comments  L ant innom in supine.  Seated in hard chair without intolerance noted.  Sacrum position improved with alignment noted.   -SW       Quarter Clearing    Quarter Clearing  Lower Quarter Clearing  -SW       DTR- Lower Quarter Clearing    Patellar tendon (L2-4)  2- Normal response  -SW    Achilles tendon (S1-2)  2- Normal response  -SW       Neural Tension Signs- Lower Quarter Clearing    Slump  Negative  -SW    SLR  Negative  -SW       Sensory Screen for Light Touch- Lower Quarter Clearing    L1 (inguinal area)  Intact  -SW    L2 (anterior mid thigh)  Intact  -SW    L3 (distal anterior thigh)  Intact  -SW    L4 (medial lower leg/foot)  Intact  -SW    L5 (lateral lower leg/great toe)  Intact  -SW    S1 (bottom of foot)  Intact  -SW       Myotomal Screen- Lower Quarter Clearing    Hip flexion (L2)  5 (Normal)  -SW    Knee extension (L3)  5 (Normal)  -SW    Knee flexion (S2)  5 (Normal)  -SW       Lumbar ROM Screen- Lower Quarter Clearing    Lumbar Flexion  Normal  -SW    Lumbar Extension  Normal  -SW    Lumbar Lateral Flexion  Normal  -SW    Lumbar Rotation  Normal  -SW       SI/Hip Screen- Lower Quarter Clearing    ASIS compression  Negative  -SW    ASIS distraction  Negative  -SW    Diana's/Magdi's test  Right:;Positive reduced flexibility on R compared to L  -SW       Special Tests/Palpation    Special Tests/Palpation  Lumbar/SI  -SW       Lumbosacral Accessory Motions    Lumbosacral Accessory Motions Tested?  Yes  -SW    PA Glide- L1  Hypomobile  -SW    PA Glide- L2  Hypomobile  -SW    PA Glide- L3  Hypomobile  -SW    PA Glide- L4  Hypomobile  -SW    PA Glide- L5  Hypomobile  -SW    PA glide- Sacral base  -- alignment symmetrical   -SW    Innominate rotation  -- L ant innom remains  -SW       Lumbar/SI Special Tests    SLR (Neural Tension)  Negative  -SW    SI  Compression Test (SI Dysfunction)  Negative  -SW    SI Distraction Test (SI Dysfunction)  Negative  -    CHANDANA (hip vs. SI Dysfunction)  Right:;Positive  -SW    Lumbar/SI Special Tests Comments  Ant innom  continues on L side.  Sacrum position improved.  Continues with inc tension to R piriformis compared to L but not specifically symptomatic.    -SW       Lumbosacral Palpation    Lumbosacral Palpation?  Yes  -SW    SI  --  -SW    Lumbosacral Segment  --  -SW    Piriformis  -- R>L tension  -SW    Sacrotubrous Ligament  Bilateral:;Tender;Guarded/taut  -SW    Coccyx  -- sidebent toward the L  -SW    Lumbosacral Palpation Comments  coccygeus and obt internus ttp noted.  Sacrotuberous ligament tight and painful bilaterally.  -      User Key  (r) = Recorded By, (t) = Taken By, (c) = Cosigned By    Initials Name Provider Type    Ambika Melendez, PT DPT Physical Therapist                     PT Assessment/Plan     Row Name 08/17/21 1000          PT Assessment    Functional Limitations  Limitation in home management;Limitations in community activities;Performance in leisure activities;Performance in self-care ADL  -     Impairments  Impaired muscle length;Impaired postural alignment;Joint mobility;Pain;Poor body mechanics;Posture  -SW     Assessment Comments  Patient continues with pain in tailbone region and discomfort with prolonged seated position.  Alignment to innominates cont with rotation, but sacral position better.  Tension noted through coccygeus and obt internus, along with sacrotuberous ligament tension also noted.  She and undersigned have been out with covid exposure which has caused dec ability to get all visits in by expiration date.  She will need extension of authorization to assist with continuation.  Tolerated treatment well with dec pain to conclude.    -     Rehab Potential  Good  -     Patient/caregiver participated in establishment of treatment plan and goals  Yes  -     Patient  would benefit from skilled therapy intervention  Yes  -SW        PT Plan    PT Frequency  1x/week  -SW     PT Plan Comments  needs dates extended with insurance.  Cont with manual work to sacrotuberous ligament.  Add clamshells next visit.    -SW       User Key  (r) = Recorded By, (t) = Taken By, (c) = Cosigned By    Initials Name Provider Type    Ambika Melendez, PT DPT Physical Therapist            OP Exercises     Row Name 08/17/21 0900             Subjective Comments    Subjective Comments  Has been out with quarantine.  Tailbone still painful.  When seated for too long period, notes pain is worse.  Felt good after first session, but then right back again.  No pain with defecation, urination or intercourse.  Just hurts to sit. Still doing the stretches.    -SW         Subjective Pain    Able to rate subjective pain?  yes  -SW      Pre-Treatment Pain Level  5  -SW      Post-Treatment Pain Level  3  -SW         Exercise 1    Exercise Name 1  standing piriformis stretch   -SW      Reps 1  3  -SW      Time 1  30 sec  -SW         Exercise 2    Exercise Name 2  DKTC bilaterally   -SW      Reps 2  3  -SW      Time 2  30 sec  -SW         Exercise 3    Exercise Name 3  hamstring stretch   -SW      Reps 3  3  -SW      Time 3  30  -SW      Additional Comments  bilaterally   -SW        User Key  (r) = Recorded By, (t) = Taken By, (c) = Cosigned By    Initials Name Provider Type    Ambika Melendez, PT DPT Physical Therapist           Manual Rx (last 36 hours)      Manual Treatments     Row Name 08/17/21 0900             Manual Rx 1    Manual Rx 1 Location  alignment assessment  -SW      Manual Rx 1 Type  MET for ant L rotation   -SW         Manual Rx 2    Manual Rx 2 Location  sacral   -SW      Manual Rx 2 Type  fascial stacking, release  -SW         Manual Rx 3    Manual Rx 3 Location  coccygeus   -SW      Manual Rx 3 Type  trigger release  -SW         Manual Rx 4    Manual Rx 4 Location  obt internus  -SW       Manual Rx 4 Type  bilateral release  -SW         Manual Rx 5    Manual Rx 5 Location  sacrotuberous ligament  -SW      Manual Rx 5 Type  STM and release  -SW        User Key  (r) = Recorded By, (t) = Taken By, (c) = Cosigned By    Initials Name Provider Type    Ambika Melendez, PT DPT Physical Therapist                    PT OP Goals     Row Name 08/17/21 0900          PT Short Term Goals    STG Date to Achieve  09/16/21  -     STG 1  patient to be independent with HEP and comply with daily completion   -SW     STG 1 Progress  Met  -     STG 2  Patient to present with innom  symmetry in supine position to not require MET for corrections.   -SW     STG 2 Progress  Progressing  -     STG 3  patient to improve length to piriformis on R side to allow CHANDANA without + indicative findings.  -SW     STG 3 Progress  New  -     STG 4  patient to report pain reduction of 3/10 or less than evaluation of 4/10 consistently.   -     STG 4 Progress  New  -     STG 5  subjective improvement of 50% better overall since induction of therapy.   -     STG 5 Progress  New  -        Long Term Goals    LTG Date to Achieve  10/29/21  -     LTG 1  patient to tolerate seated posture x 1.5 hours to allow movie watch without inc pain or transition required.   -     LTG 1 Progress  New  -     LTG 2  pain to be mild 2/10 or less without required Tylenol or ice use.   -     LTG 2 Progress  New  -     LTG 3  patient to resume normal daily activities at 70-80% without restrictions.   -     LTG 3 Progress  New  -     LTG 4  sacral position to be improved without MET required.   -     LTG 4 Progress  New  -        Time Calculation    PT Goal Re-Cert Due Date  09/16/21  -       User Key  (r) = Recorded By, (t) = Taken By, (c) = Cosigned By    Initials Name Provider Type    Ambika Melendez, PT DPT Physical Therapist           Therapy Education  Given: HEP  Program: Reinforced, Progressed  How  Provided: Verbal, Demonstration  Provided to: Patient  Level of Understanding: Teach back education performed, Verbalized, Demonstrated              Time Calculation:   Start Time: 0916  Stop Time: 1018  Time Calculation (min): 62 min  Therapy Charges for Today     Code Description Service Date Service Provider Modifiers Qty    99755064323  PT THER PROC EA 15 MIN 8/17/2021 Ambika Garcia, PT DPT GP 1    50698175614 HC PT MANUAL THERAPY EA 15 MIN 8/17/2021 Ambika Garcia, PT DPT GP 3                    Ambika Garcia, PT DPT  8/17/2021

## 2021-08-26 ENCOUNTER — APPOINTMENT (OUTPATIENT)
Dept: PHYSICAL THERAPY | Facility: HOSPITAL | Age: 31
End: 2021-08-26

## 2021-08-31 ENCOUNTER — HOSPITAL ENCOUNTER (OUTPATIENT)
Dept: PHYSICAL THERAPY | Facility: HOSPITAL | Age: 31
Setting detail: THERAPIES SERIES
Discharge: HOME | End: 2021-08-31

## 2021-08-31 DIAGNOSIS — M53.3 COCCYX PAIN: Primary | ICD-10-CM

## 2021-08-31 PROCEDURE — 97035 APP MDLTY 1+ULTRASOUND EA 15: CPT | Performed by: PHYSICAL THERAPIST

## 2021-08-31 PROCEDURE — 97110 THERAPEUTIC EXERCISES: CPT | Performed by: PHYSICAL THERAPIST

## 2021-08-31 PROCEDURE — 97140 MANUAL THERAPY 1/> REGIONS: CPT | Performed by: PHYSICAL THERAPIST

## 2021-09-09 ENCOUNTER — APPOINTMENT (OUTPATIENT)
Dept: PHYSICAL THERAPY | Facility: HOSPITAL | Age: 31
End: 2021-09-09

## 2021-09-16 ENCOUNTER — APPOINTMENT (OUTPATIENT)
Dept: PHYSICAL THERAPY | Facility: HOSPITAL | Age: 31
End: 2021-09-16

## 2021-09-23 ENCOUNTER — HOSPITAL ENCOUNTER (OUTPATIENT)
Dept: PHYSICAL THERAPY | Facility: HOSPITAL | Age: 31
Setting detail: THERAPIES SERIES
Discharge: HOME | End: 2021-09-23

## 2021-09-23 DIAGNOSIS — M53.3 COCCYX PAIN: Primary | ICD-10-CM

## 2021-09-23 PROCEDURE — 97110 THERAPEUTIC EXERCISES: CPT | Performed by: PHYSICAL THERAPIST

## 2021-09-23 PROCEDURE — 97140 MANUAL THERAPY 1/> REGIONS: CPT | Performed by: PHYSICAL THERAPIST

## 2021-09-23 NOTE — THERAPY TREATMENT NOTE
Outpatient Physical Therapy Pelvic Health Treatment Note  [facility]     Patient Name: Renetta Cote  : 1990  MRN: [id number]  Today's Date: 2021        Visit Date: 2021  Visit number:   Recheck: 10/21/21  Insurance: [employer]  Improvement: 50%    Visit Dx:    ICD-10-CM ICD-9-CM   1. Coccyx pain  M53.3 724.79       Patient Active Problem List   Diagnosis   • Esophageal reflux   • IBS (irritable bowel syndrome)   • Vitamin D deficiency   • Endometriosis   • Chronic tension headaches   • Generalized anxiety disorder   • Encounter for care or examination of lactating mother   • Postpartum state   • Screening for malignant neoplasm of cervix        Pelvic Health     Row Name 21 0900             Subjective Comments    Subjective Comments  Has been out because she forgot appt and couldn't get here in time.  Tailbone is good.  Has noticed only a little change recently, but better than what it was when she started coming.  Tolerance to seated position about 45 minutes regardless of type of seat.  Bowel evacuation is good. Pt reports about 50% of pain has improved.    -SW         Pregnancy Questions    Number of Pregnancies  2  -SW      Number of Miscarriages  0  -SW      Number of Children  2  -SW      Type of Previous Deliveries  Vaginal  -SW         Pelvic Floor Muscle    Patient/Parent/Guardian Consented to Internal Pelvic Floor Exam  Yes  -SW      Strength (Right)  3: Squeeze with/without lift  -SW      Strength (Left)  3: Squeeze with/without lift  -SW      Symmetry of Sustained Maximal Contraction  Symmetrical  -SW      Endurance (Ability to Hold Maximal Contraction)  5 sec  -SW      # of Reps of Maximal Contractions while Maintaining Endurance and Strength  5 sets  -SW      Fast Contraction (# of 1 sec contractions performed)  10  -SW      Internal Pelvic Floor Comments  pubococcygeus and iliococcygeus tenderness bilaterally. R side inc tension compared to L but both  tender.  Obt internus R ttp.   -SW      External Pelvic Floor Comments  unremarkable.   -SW         Observations    Perineal Observation Performed?  Yes  -SW         Observation of Contraction in Perineum    Anal [place]  Present  -SW      Perineal Body Lift  Present  -SW         Pelvic Floor    Ability to Isolate Contraction of Pelvic Floor  Yes  -SW        User Key  (r) = Recorded By, (t) = Taken By, (c) = Cosigned By    Initials Name Provider Type    SW Ambika Garcia, PT DPT Physical Therapist        PT Ortho     Row Name 09/23/21 0900       Subjective Pain    Able to rate subjective pain?  yes  -SW    Pre-Treatment Pain Level  0  -SW    Post-Treatment Pain Level  0  -SW       Posture/Observations    Posture- WNL  Posture is WNL  -SW    Alignment Options  Thoracic kyphosis mild  -SW    Thoracic Kyphosis  Mild  -SW    Lumbar lordosis  Mild  -SW    Posture/Observations Comments  symmetry at pelvic girdle.  Good spirits.  Seated comfortably awaiting treatment.   -SW       Quarter Clearing    Quarter Clearing  Lower Quarter Clearing  -SW       DTR- Lower Quarter Clearing    Patellar tendon (L2-4)  2- Normal response  -SW    Achilles tendon (S1-2)  2- Normal response  -SW       Neural Tension Signs- Lower Quarter Clearing    Slump  Negative  -SW    SLR  Negative  -SW       Sensory Screen for Light Touch- Lower Quarter Clearing    L1 (inguinal area)  Intact  -SW    L2 (anterior mid thigh)  Intact  -SW    L3 (distal anterior thigh)  Intact  -SW    L4 (medial lower leg/foot)  Intact  -SW    L5 (lateral lower leg/great toe)  Intact  -SW    S1 (bottom of foot)  Intact  -SW       Myotomal Screen- Lower Quarter Clearing    Hip flexion (L2)  5 (Normal)  -SW    Knee extension (L3)  5 (Normal)  -SW    Knee flexion (S2)  5 (Normal)  -SW       Lumbar ROM Screen- Lower Quarter Clearing    Lumbar Flexion  Normal  -SW    Lumbar Extension  Normal  -SW    Lumbar Lateral Flexion  Normal  -SW    Lumbar Rotation  Normal  -SW        SI/Hip Screen- Lower Quarter Clearing    ASIS compression  Negative  -SW    ASIS distraction  Negative  -SW    Diana's/Magdi's test  Negative  -SW       Special Tests/Palpation    Special Tests/Palpation  Lumbar/SI  -SW       Lumbosacral Accessory Motions    Lumbosacral Accessory Motions Tested?  Yes  -SW    PA Glide- L1  Hypomobile  -SW    PA Glide- L2  Hypomobile  -SW    PA Glide- L3  Hypomobile  -SW    PA Glide- L4  Hypomobile  -SW    PA Glide- L5  Hypomobile  -SW    PA glide- Sacral base  -- alignment symmetrical   -SW    Innominate rotation  -- symmetrical  -SW       Lumbar/SI Special Tests    SLR (Neural Tension)  Negative  -SW    SI Compression Test (SI Dysfunction)  Negative  -SW    SI Distraction Test (SI Dysfunction)  Negative  -SW    DIANA (hip vs. SI Dysfunction)  Negative  -SW    Lumbar/SI Special Tests Comments  improved symmetry at pelvic girdle.  Still with concerns of sidebent coccyx to L side.    -SW       Lumbosacral Palpation    Lumbosacral Palpation?  Yes  -    Piriformis  --  -    Sacrotubrous Ligament  Bilateral:;Tender;Guarded/taut  -SW    Coccyx  -- sidebent toward the L  -SW    Lumbosacral Palpation Comments  Improved tenderness directly to coccygeus muscle. Alignment good except continued concerns with sidebent to coccyx to L.    -      User Key  (r) = Recorded By, (t) = Taken By, (c) = Cosigned By    Initials Name Provider Type    Ambika Melendez, PT DPT Physical Therapist                     PT Assessment/Plan     Row Name 09/23/21 1500          PT Assessment    Functional Limitations  Limitation in home management;Limitations in community activities;Performance in leisure activities;Performance in self-care ADL  -SW     Impairments  Impaired muscle length;Impaired postural alignment;Joint mobility;Pain;Poor body mechanics;Posture  -SW     Assessment Comments  Patient presents with improved symptoms, but remains with dec sitting tolerance.  She agreed to internal pelvic  assessment to assess internal structures. Found inc tension in intravaginal cuff R more taut than L but found bilaterally.  Assessed vaginally first but will assess rectally next visit if continued discomfort noted.  Pt responded well without adverse effects to treatment.  STG 1, 2, 3 and 5 met. Good progression toward other goals.   -SW     Rehab Potential  Good  -SW     Patient/caregiver participated in establishment of treatment plan and goals  Yes  -SW     Patient would benefit from skilled therapy intervention  Yes  -SW        PT Plan    PT Frequency  1x/week  -     PT Plan Comments  cont internal manual work as beneficial.  Next step will be rectal assessment with coccyx mobilization as indicated.   -       User Key  (r) = Recorded By, (t) = Taken By, (c) = Cosigned By    Initials Name Provider Type    SW Ambika Garcia, PT DPT Physical Therapist            OP Exercises     Row Name 09/23/21 0900             Subjective Comments    Subjective Comments  Has been out because she forgot appt and couldn't get here in time.  Tailbone is good.  Has noticed only a little change recently, but better than what it was when she started coming.  Tolerance to seated position about 45 minutes regardless of type of seat.  Bowel evacuation is good. Pt reports about 50% of pain has improved.    -SW         Subjective Pain    Able to rate subjective pain?  yes  -SW      Pre-Treatment Pain Level  0  -SW      Post-Treatment Pain Level  0  -SW         Exercise 1    Exercise Name 1  PF contract relax  -SW      Sets 1  2  -SW      Reps 1  10  -SW      Additional Comments  manual overpressure assist with relaxation of PF.    -SW         Exercise 2    Exercise Name 2  sacral flexion and extension  -SW      Reps 2  10  -SW         Exercise 3    Exercise Name 3  stuck drawer technique for coccyx  -SW      Additional Comments  attempted stuck drawer for correction of sidebent and flexed coccyx.  Little to no change in position.    -         Exercise 4    Exercise Name 4  DKTC flexion with PF excursion   -      Reps 4  3  -SW      Time 4  30 sec  -        User Key  (r) = Recorded By, (t) = Taken By, (c) = Cosigned By    Initials Name Provider Type    Ambika Melendez, PT DPT Physical Therapist           Manual Rx (last 36 hours)      Manual Treatments     Row Name 09/23/21 1500             Manual Rx 1    Manual Rx 1 Location  alignment check   -         Manual Rx 2    Manual Rx 2 Location  intravaginal cuff release pubococcygeus, obt internus and ischiococcygeus   -        User Key  (r) = Recorded By, (t) = Taken By, (c) = Cosigned By    Initials Name Provider Type    Ambika Melendez, PT DPT Physical Therapist                    PT OP Goals     Row Name 09/23/21 0900          PT Short Term Goals    STG Date to Achieve  10/21/21  -     STG 1  patient to be independent with HEP and comply with daily completion   -     STG 1 Progress  Met  -     STG 2  Patient to present with innom  symmetry in supine position to not require MET for corrections.   -     STG 2 Progress  Met  -     STG 3  patient to improve length to piriformis on R side to allow CHANDANA without + indicative findings.  -     STG 3 Progress  Met  -     STG 4  patient to report pain reduction of 3/10 or less than evaluation of 4/10 consistently.   -     STG 4 Progress  Progressing  -     STG 5  subjective improvement of 50% better overall since induction of therapy.   -     STG 5 Progress  Met  -        Long Term Goals    LTG Date to Achieve  10/29/21  -     LTG 1  patient to tolerate seated posture x 1.5 hours to allow movie watch without inc pain or transition required.   -     LTG 1 Progress  New  -     LTG 2  pain to be mild 2/10 or less without required Tylenol or ice use.   -     LTG 2 Progress  New  -     LTG 3  patient to resume normal daily activities at 70-80% without restrictions.   -     LTG 3 Progress  New  -      LTG 4  sacral position to be improved without MET required.   -SW     LTG 4 Progress  New  -        Time Calculation    PT Goal Re-Cert Due Date  10/21/21  -       User Key  (r) = Recorded By, (t) = Taken By, (c) = Cosigned By    Initials Name Provider Type    Ambika Melendez, PT DPT Physical Therapist           Therapy Education  Given: HEP, Symptoms/condition management  Program: Reinforced, Progressed  How Provided: Verbal, Demonstration, Written  Provided to: Patient  Level of Understanding: Teach back education performed, Verbalized, Demonstrated              Time Calculation:   Start Time: 0918  Stop Time: 1020  Time Calculation (min): 62 min  Therapy Charges for Today     Code Description Service Date Service Provider Modifiers Qty    31064854417 HC PT THER PROC EA 15 MIN 9/23/2021 Ambika Garcia, PT DPT GP 2    83912804646 HC PT MANUAL THERAPY EA 15 MIN 9/23/2021 Ambika Garcia, PT DPT GP 2                    Ambika Garcia PT DPT  9/23/2021

## 2021-09-30 ENCOUNTER — APPOINTMENT (OUTPATIENT)
Dept: PHYSICAL THERAPY | Facility: HOSPITAL | Age: 31
End: 2021-09-30

## 2021-10-11 ENCOUNTER — APPOINTMENT (OUTPATIENT)
Dept: PHYSICAL THERAPY | Facility: HOSPITAL | Age: 31
End: 2021-10-11

## 2021-10-19 ENCOUNTER — APPOINTMENT (OUTPATIENT)
Dept: PHYSICAL THERAPY | Facility: HOSPITAL | Age: 31
End: 2021-10-19

## 2021-10-26 ENCOUNTER — HOSPITAL ENCOUNTER (OUTPATIENT)
Dept: PHYSICAL THERAPY | Facility: HOSPITAL | Age: 31
Setting detail: THERAPIES SERIES
Discharge: HOME | End: 2021-10-26

## 2021-10-26 DIAGNOSIS — M53.3 COCCYX PAIN: Primary | ICD-10-CM

## 2021-10-26 PROCEDURE — 97110 THERAPEUTIC EXERCISES: CPT | Performed by: PHYSICAL THERAPIST

## 2021-10-26 NOTE — THERAPY TREATMENT NOTE
Outpatient Physical Therapy Pelvic Health Progress Note  [facility]     Patient Name: Renetta Cote  : 1990  MRN: [id number]  Today's Date: 10/26/2021        Visit Date: 10/26/2021   Visit number:   Recheck: 21  Insurance: [employer]  Improvement: 60%      Visit Dx:    ICD-10-CM ICD-9-CM   1. Coccyx pain  M53.3 724.79       Patient Active Problem List   Diagnosis   • Esophageal reflux   • IBS (irritable bowel syndrome)   • Vitamin D deficiency   • Endometriosis   • Chronic tension headaches   • Generalized anxiety disorder   • Encounter for care or examination of lactating mother   • Postpartum state   • Screening for malignant neoplasm of cervix         Pelvic Health     Row Name 10/26/21 0900             Subjective Comments    Subjective Comments Pain about the same. I have had some progress, but still unable to sit for longer than 30 minutes.Subjective improvement about 60%.  -SW              Pregnancy Questions    Number of Pregnancies 2  -SW      Number of Miscarriages 0  -SW      Number of Children 2  -SW      Type of Previous Deliveries Vaginal  -SW              Pelvic Floor Muscle    Internal Pelvic Floor Comments no internal palpation this date.  -SW      External Pelvic Floor Comments mild sacrotuberous ligament tenderness;  -SW              Pelvic Floor    Ability to Isolate Contraction of Pelvic Floor Yes  -SW              SEMG Evaluation    Pelvic Floor Electrode Internal Vaginal  -SW      SEMG Evaluation Comments initial readings to PF showed normal resting baselines.  However, she showed unvolitional elevation of PF contraction at bursts throughout resting points that were sensed by patient but not do so intentionally.  She was able to contract muscles with isolation with high amplitudes, but noted to release muscle though not always returning to baseline.  She cont with elevation of baseline at 3.5 intermittently without ability to fully relax.  the erratic behavior of  muscle continued intermittently throughout assessment training. [place] graph used for downtraining with good PF awareness.  -            User Key  (r) = Recorded By, (t) = Taken By, (c) = Cosigned By    Initials Name Provider Type    Ambika Melendez PT DPT Physical Therapist               PT Ortho     Row Name 10/26/21 0900       Subjective Pain    Able to rate subjective pain? yes  -SW    Pre-Treatment Pain Level 0  -SW    Post-Treatment Pain Level 0  -SW       Posture/Observations    Posture/Observations Comments MD note sent re: Amitripyline or other alternative medications for management. Await reply.  Overall alignment unchanged.  -SW       Lumbosacral Accessory Motions    PA glide- Sacral base --  symmetrical  -SW    Innominate rotation --  symmetrical  -SW       Lumbar/SI Special Tests    Lumbar/SI Special Tests Comments All provacative tests negative for SI or LB involvement.  No neural tension noted in examination.  -SW       Lumbosacral Palpation    Sacrotubrous Ligament Guarded/taut; Tender  -SW    Coccyx --  cont with flexed and SB position to L  -SW    Lumbosacral Palpation Comments Muscle tension remains reduced with good pliability of muscles.  Coccyx cont with mild presentation of SB and slightly flexed position.  Able to recognize contraction and relaxation to PF but cont to hold tension to PF such that relaxation does not achieve complete and total relaxation.  -          User Key  (r) = Recorded By, (t) = Taken By, (c) = Cosigned By    Initials Name Provider Type    Ambika Melendez, PT DPT Physical Therapist                            PT Assessment/Plan     Row Name 10/26/21 1700          PT Assessment    Assessment Comments Patient reports having improved sinced induction of PT at 60%.  However, she continues to see pain with seated posture.  She openly admits to having tension throughout daily life that is recognized and medically treated but she states that she never took her  meds as she felt it was not needed.  Discussed with patient relation to stress and PF irregularity and she voices acknowledgement that pain is somewhat worse when she is stressed.  MD contacted about prescription of Amitrityline or similar medication to see if that would assist with relaxation to point that patient could achieve improved sitting tolerance.  She has struggled with compliance to her appts which has offered its challenges.  Will await MD recommendations but will continue with downtraining and muscle coordiation. STG 1-3 and 5 met with good progression toward other goals.  -SW     Rehab Potential Good  -SW     Patient/caregiver participated in establishment of treatment plan and goals Yes  -SW     Patient would benefit from skilled therapy intervention Yes  -SW            PT Plan    PT Frequency 1x/week  -SW     PT Plan Comments cont with SEMG downtraining as necessary.  Possible internal mobilization and/or rectal assessment if approved by patient.  -SW           User Key  (r) = Recorded By, (t) = Taken By, (c) = Cosigned By    Initials Name Provider Type    Ambika Melendez, PT DPT Physical Therapist                   OP Exercises     Row Name 10/26/21 0900             Subjective Comments    Subjective Comments Pain about the same. I have had some progress, but still unable to sit for longer than 30 minutes.Subjective improvement about 60%.  -SW              Subjective Pain    Able to rate subjective pain? yes  -SW      Pre-Treatment Pain Level 0  -SW      Post-Treatment Pain Level 0  -SW              Exercise 1    Exercise Name 1 postural alignment  -SW      Additional Comments no MET required this date  -SW              Exercise 2    Exercise Name 2 SEMG supine resting  -SW      Additional Comments normal baselines with erratic behavioral to PF intermittently.  Able to achieve baseline but not readily and consistently.  -SW              Exercise 3    Exercise Name 3 downtraining PF  -SW       Additional Comments [place] graph used to downtrain PF.  -              Exercise 4    Exercise Name 4 PF contract with focused relaxation to [place] graph  -      Additional Comments resting tone changed with elevation in beginning for goal attainment.  As imprmovement recognized, tone goal reduced to normal at 2.9 or less.  Improved attainment of goal but not consistently.  Challenged the patient.  -            User Key  (r) = Recorded By, (t) = Taken By, (c) = Cosigned By    Initials Name Provider Type    Ambika Melendez, PT DPT Physical Therapist                             PT OP Goals     Row Name 10/26/21 1700          PT Short Term Goals    STG Date to Achieve 11/26/21  -     STG 1 patient to be independent with HEP and comply with daily completion   -     STG 1 Progress Met  -     STG 2 Patient to present with innom  symmetry in supine position to not require MET for corrections.   -     STG 2 Progress Met  -     STG 3 patient to improve length to piriformis on R side to allow CHANDANA without + indicative findings.  -     STG 3 Progress Met  -     STG 4 patient to report pain reduction of 3/10 or less than evaluation of 4/10 consistently.   -     STG 4 Progress Progressing  -     STG 5 subjective improvement of 50% better overall since induction of therapy.   -     STG 5 Progress Met  -            Long Term Goals    LTG Date to Achieve 10/29/21  -     LTG 1 patient to tolerate seated posture x 1.5 hours to allow movie watch without inc pain or transition required.   -     LTG 1 Progress New; Progressing  -     LTG 2 pain to be mild 2/10 or less without required Tylenol or ice use.   -     LTG 2 Progress Progressing  -     LTG 3 patient to resume normal daily activities at 70-80% without restrictions.   -     LTG 3 Progress Progressing  -     LTG 3 Progress Comments 60% currenbtly  -     LTG 4 sacral position to be improved without MET required.   -     LTG 4  Progress New  -            Time Calculation    PT Goal Re-Cert Due Date 11/26/21  -           User Key  (r) = Recorded By, (t) = Taken By, (c) = Cosigned By    Initials Name Provider Type    Ambika Melendez PT DPT Physical Therapist                 Therapy Education  Given: HEP, Symptoms/condition management  Program: Reinforced, Progressed  How Provided: Verbal, Demonstration  Provided to: Patient  Level of Understanding: Teach back education performed, Verbalized, Demonstrated              Time Calculation:   Start Time: 0925  Stop Time: 1025  Time Calculation (min): 60 min  Therapy Charges for Today     Code Description Service Date Service Provider Modifiers Qty    86228687735  PT THER PROC EA 15 MIN 10/26/2021 Ambika Garcia, PT DPT GP 4                    Ambika Garcia PT DPT  10/26/2021

## 2021-11-18 PROCEDURE — 87635 SARS-COV-2 COVID-19 AMP PRB: CPT | Performed by: EMERGENCY MEDICINE

## 2021-12-07 ENCOUNTER — TELEPHONE (OUTPATIENT)
Dept: FAMILY MEDICINE CLINIC | Facility: CLINIC | Age: 31
End: 2021-12-07

## 2021-12-07 NOTE — TELEPHONE ENCOUNTER
I have called Ms. Cote and schedule her for a follow-up visit on 12/13/2021 at 8:15 AM, you had a CPE cancel and I put her in there for her follow-up appointment and PT recommended follow-up.   She will have her labs done before her appointment.     ----- Message from Toya Bucio MD sent at 12/7/2021 11:39 AM CST -----  Regarding: FW: medication question  Can you please contact patient to schedule follow up appointment?  Her therapist has suggested discussing some medication treatments for coccyx pain.  She was supposed to follow up and plan was to discuss at visit, however last visit no showed and not rescheduled.  Thanks, DANNY Bucio  ----- Message -----  From: Ambika Garcia, PT DPT  Sent: 10/26/2021   5:10 PM CST  To: Toya Bucio MD  Subject: medication question                              Good evening.  I am writing in re: to the above patient you referred to me for coccyx pain.  She is showing some improvements but continues with inc tension along pelvic floor which may be causing continued pain to coccyx.      Her surface EMG of pelvic muscles today suggested irregularity of signal to PF with inability to full relax muscles.  This evidence of hypertonicity is often seen among patients of pain and/or high levels of anxiety.  I questioned patient about anxiety and she admits to having been diagnosed with it in past.   She recognizes her inability to relax.  She has been given medication in the past to take, but did not take the meds upon receiving them and didn't think she needed.  She is open to the idea of trying possible alternative medication to assist, as this may also give some relief to pelvic floor after acknowledging the correlation.      Many of my coccydynia patients are given Amitriptyline and found to be successful in the management of condition.  This med is often used in anxiety conditions, but also off-label with high tension patients as well.  Would this be something to  consider for this patient? Or might you have something similar you feel would work better?  Valium has been an alternative med prescribed but I have found dec tolerance to that med due to systemic effects it takes.      I would love to hear your thoughts.  She has been a little inconsistent in her attendance with therapy which has offered its challenges.  Just looking for best way of management for her condition.      Thank you for your consideration.      Dr. Ambika Garcia, PT, DPT, BCB-PMD

## 2021-12-09 ENCOUNTER — LAB (OUTPATIENT)
Dept: LAB | Facility: HOSPITAL | Age: 31
End: 2021-12-09

## 2021-12-09 DIAGNOSIS — E61.1 IRON DEFICIENCY: ICD-10-CM

## 2021-12-09 DIAGNOSIS — E53.8 VITAMIN B12 DEFICIENCY: ICD-10-CM

## 2021-12-09 DIAGNOSIS — E55.9 VITAMIN D DEFICIENCY: ICD-10-CM

## 2021-12-09 PROCEDURE — 82607 VITAMIN B-12: CPT

## 2021-12-09 PROCEDURE — 82306 VITAMIN D 25 HYDROXY: CPT

## 2021-12-09 PROCEDURE — 84466 ASSAY OF TRANSFERRIN: CPT

## 2021-12-09 PROCEDURE — 83540 ASSAY OF IRON: CPT

## 2021-12-09 PROCEDURE — 82728 ASSAY OF FERRITIN: CPT

## 2021-12-09 PROCEDURE — 36415 COLL VENOUS BLD VENIPUNCTURE: CPT

## 2021-12-10 LAB
25(OH)D3 SERPL-MCNC: 17.8 NG/ML (ref 30–100)
FERRITIN SERPL-MCNC: 33.4 NG/ML (ref 13–150)
IRON 24H UR-MRATE: 78 MCG/DL (ref 37–145)
IRON SATN MFR SERPL: 16 % (ref 20–50)
TIBC SERPL-MCNC: 496 MCG/DL (ref 298–536)
TRANSFERRIN SERPL-MCNC: 333 MG/DL (ref 200–360)
VIT B12 BLD-MCNC: 280 PG/ML (ref 211–946)

## 2021-12-13 ENCOUNTER — TELEPHONE (OUTPATIENT)
Dept: FAMILY MEDICINE CLINIC | Facility: CLINIC | Age: 31
End: 2021-12-13

## 2021-12-13 RX ORDER — ERGOCALCIFEROL 1.25 MG/1
50000 CAPSULE ORAL WEEKLY
Qty: 8 CAPSULE | Refills: 1 | Status: SHIPPED | OUTPATIENT
Start: 2021-12-13 | End: 2022-01-24

## 2021-12-13 RX ORDER — LANOLIN ALCOHOL/MO/W.PET/CERES
1000 CREAM (GRAM) TOPICAL DAILY
Qty: 60 TABLET | Refills: 1 | Status: SHIPPED | OUTPATIENT
Start: 2021-12-13 | End: 2022-01-24

## 2021-12-13 NOTE — TELEPHONE ENCOUNTER
Will review at office visit 12/13/2021.  Patient would benefit from iron supplement, vitamin B12 and vitamin D supplement.  These medications were sent to pharmacy.  Rasheed Bucio

## 2022-01-24 ENCOUNTER — OFFICE VISIT (OUTPATIENT)
Dept: FAMILY MEDICINE CLINIC | Facility: CLINIC | Age: 32
End: 2022-01-24

## 2022-01-24 VITALS
HEART RATE: 76 BPM | BODY MASS INDEX: 29.26 KG/M2 | DIASTOLIC BLOOD PRESSURE: 72 MMHG | OXYGEN SATURATION: 98 % | HEIGHT: 62 IN | WEIGHT: 159 LBS | SYSTOLIC BLOOD PRESSURE: 104 MMHG

## 2022-01-24 DIAGNOSIS — E55.9 VITAMIN D DEFICIENCY: ICD-10-CM

## 2022-01-24 DIAGNOSIS — M53.3 COCCYX PAIN: Primary | ICD-10-CM

## 2022-01-24 DIAGNOSIS — E53.8 VITAMIN B12 DEFICIENCY: ICD-10-CM

## 2022-01-24 DIAGNOSIS — F33.0 MILD EPISODE OF RECURRENT MAJOR DEPRESSIVE DISORDER: ICD-10-CM

## 2022-01-24 DIAGNOSIS — E61.1 IRON DEFICIENCY: ICD-10-CM

## 2022-01-24 PROCEDURE — 99214 OFFICE O/P EST MOD 30 MIN: CPT | Performed by: FAMILY MEDICINE

## 2022-01-24 RX ORDER — ERGOCALCIFEROL 1.25 MG/1
50000 CAPSULE ORAL WEEKLY
Qty: 8 CAPSULE | Refills: 1 | Status: SHIPPED | OUTPATIENT
Start: 2022-01-24 | End: 2022-01-24

## 2022-01-24 RX ORDER — AMITRIPTYLINE HYDROCHLORIDE 10 MG/1
10 TABLET, FILM COATED ORAL NIGHTLY
Qty: 30 TABLET | Refills: 2 | Status: SHIPPED | OUTPATIENT
Start: 2022-01-24 | End: 2022-01-24 | Stop reason: SDUPTHER

## 2022-01-24 RX ORDER — ERGOCALCIFEROL 1.25 MG/1
50000 CAPSULE ORAL WEEKLY
Qty: 8 CAPSULE | Refills: 1 | Status: SHIPPED | OUTPATIENT
Start: 2022-01-24 | End: 2022-01-24 | Stop reason: SDUPTHER

## 2022-01-24 RX ORDER — AMITRIPTYLINE HYDROCHLORIDE 10 MG/1
10 TABLET, FILM COATED ORAL NIGHTLY
Qty: 30 TABLET | Refills: 2 | Status: SHIPPED | OUTPATIENT
Start: 2022-01-24 | End: 2022-01-24

## 2022-01-24 RX ORDER — LANOLIN ALCOHOL/MO/W.PET/CERES
1000 CREAM (GRAM) TOPICAL DAILY
Qty: 60 TABLET | Refills: 1 | Status: SHIPPED | OUTPATIENT
Start: 2022-01-24 | End: 2022-01-24

## 2022-01-24 RX ORDER — AMITRIPTYLINE HYDROCHLORIDE 10 MG/1
10 TABLET, FILM COATED ORAL NIGHTLY
Qty: 30 TABLET | Refills: 2 | Status: SHIPPED | OUTPATIENT
Start: 2022-01-24 | End: 2022-04-27

## 2022-01-24 RX ORDER — LANOLIN ALCOHOL/MO/W.PET/CERES
1000 CREAM (GRAM) TOPICAL DAILY
Qty: 60 TABLET | Refills: 1 | Status: SHIPPED | OUTPATIENT
Start: 2022-01-24 | End: 2022-01-24 | Stop reason: SDUPTHER

## 2022-01-24 RX ORDER — ERGOCALCIFEROL 1.25 MG/1
50000 CAPSULE ORAL WEEKLY
Qty: 8 CAPSULE | Refills: 1 | Status: SHIPPED | OUTPATIENT
Start: 2022-01-24 | End: 2022-04-27

## 2022-01-24 RX ORDER — LANOLIN ALCOHOL/MO/W.PET/CERES
1000 CREAM (GRAM) TOPICAL DAILY
Qty: 60 TABLET | Refills: 1 | Status: SHIPPED | OUTPATIENT
Start: 2022-01-24 | End: 2022-04-27

## 2022-01-24 NOTE — PROGRESS NOTES
"Chief Complaint  Back Pain (recheck)    Subjective          Renetta Lourdes Cote presents to [facility] [place]  History of Present Illness    Patient seen today for follow-up.  Has history of coccyx pain.  She has been seeing pelvic floor physical therapist, notes that after sessions she would get relief for several days from the pain.  Pain relief is not long lasting.  Patient does do some of the exercises at home.  Not currently taking anything for pain at home.  Amitriptyline has been recommended as an option to consider for this pain, especially given patient's history of anxiety and depression.  She notes that her anxiety and depression symptoms are not well controlled.  Been consistent about taking medications.  Depression in the past.  Patient did not  any medications that were sent to the pharmacy or supplements her last set of labs.     Objective   Vital Signs:   /72   Pulse 76   Ht 157.5 cm (62\")   Wt 72.1 kg (159 lb)   SpO2 98%   BMI 29.08 kg/m²     Physical Exam  Vitals reviewed.   Constitutional:       General: She is not in acute distress.     Appearance: She is well-developed.   Cardiovascular:      Rate and Rhythm: Normal rate and regular rhythm.      Heart sounds: Normal heart sounds. No murmur heard.      Pulmonary:      Effort: Pulmonary effort is normal. No respiratory distress.      Breath sounds: Normal breath sounds. No wheezing or rales.   Abdominal:      Palpations: Abdomen is soft.      Tenderness: There is no abdominal tenderness.   Skin:     General: Skin is warm and dry.   Neurological:      Mental Status: She is alert and oriented to person, place, and time.        Result Review :   The following data was reviewed by: Toya Bucio MD on 01/24/2022:  Common labs    Common Labsle 4/20/21 4/20/21 4/20/21    1056 1056 1056   Glucose  94    BUN  12    Creatinine  0.72    eGFR Non African Am  94    Sodium  142  "   Potassium  5.2    Chloride  105    Calcium  9.1    Albumin  4.40    Total Bilirubin  0.3    Alkaline Phosphatase  83    AST (SGOT)  13    ALT (SGPT)  10    WBC 5.77     Hemoglobin 12.6     Hematocrit 36.6     Platelets 260     Hemoglobin A1C   5.31           Reviewed with patient iron studies, vitamin D and vitamin B12 which were done 12/9/2021              Assessment and Plan    Diagnoses and all orders for this visit:    1. Coccyx pain (Primary)  -     Discontinue: amitriptyline (ELAVIL) 10 MG tablet; Take 1 tablet by mouth Every Night.  Dispense: 30 tablet; Refill: 2  -     amitriptyline (ELAVIL) 10 MG tablet; Take 1 tablet by mouth Every Night.  Dispense: 30 tablet; Refill: 2    2. Iron deficiency  -     Discontinue: Iron, Ferrous Sulfate, 325 (65 Fe) MG tablet; Take 325 mg by mouth 3 (Three) Times a Week.  Dispense: 30 tablet; Refill: 1  -     Iron, Ferrous Sulfate, 325 (65 Fe) MG tablet; Take 325 mg by mouth 3 (Three) Times a Week.  Dispense: 30 tablet; Refill: 1    3. Vitamin B12 deficiency  -     Discontinue: vitamin B-12 (CYANOCOBALAMIN) 1000 MCG tablet; Take 1 tablet by mouth Daily.  Dispense: 60 tablet; Refill: 1  -     vitamin B-12 (CYANOCOBALAMIN) 1000 MCG tablet; Take 1 tablet by mouth Daily.  Dispense: 60 tablet; Refill: 1    4. Vitamin D deficiency  -     Discontinue: vitamin D (ERGOCALCIFEROL) 1.25 MG (63407 UT) capsule capsule; Take 1 capsule by mouth 1 (One) Time Per Week.  Dispense: 8 capsule; Refill: 1  -     vitamin D (ERGOCALCIFEROL) 1.25 MG (94071 UT) capsule capsule; Take 1 capsule by mouth 1 (One) Time Per Week.  Dispense: 8 capsule; Refill: 1    5. Mild episode of recurrent major depressive disorder (HCC)  -     Discontinue: amitriptyline (ELAVIL) 10 MG tablet; Take 1 tablet by mouth Every Night.  Dispense: 30 tablet; Refill: 2  -     amitriptyline (ELAVIL) 10 MG tablet; Take 1 tablet by mouth Every Night.  Dispense: 30 tablet; Refill: 2      Patient seen today for follow-up  Persistent  coccyx pain, continue pelvic floor exercises at home  Trial of amitriptyline, risk and benefits of this medication discussed  Amitriptyline should also help with uncontrolled depression symptoms that patient has been experiencing  Advised that medication may need to be increased after a few weeks on therapy    Lab work reviewed with patient in the office today  Patient has iron, B12 and vitamin D deficiency, prescriptions resent to the pharmacy.            Follow Up   Return in about 3 months (around 4/24/2022) for Annual physical.  Patient was given instructions and counseling regarding her condition or for health maintenance advice. Please see specific information pulled into the AVS if appropriate.           This document has been electronically signed by Toya Bucio MD

## 2022-01-27 PROCEDURE — U0003 INFECTIOUS AGENT DETECTION BY NUCLEIC ACID (DNA OR RNA); SEVERE ACUTE RESPIRATORY SYNDROME CORONAVIRUS 2 (SARS-COV-2) (CORONAVIRUS DISEASE [COVID-19]), AMPLIFIED PROBE TECHNIQUE, MAKING USE OF HIGH THROUGHPUT TECHNOLOGIES AS DESCRIBED BY CMS-2020-01-R: HCPCS | Performed by: NURSE PRACTITIONER

## 2022-04-27 ENCOUNTER — LAB (OUTPATIENT)
Dept: LAB | Facility: HOSPITAL | Age: 32
End: 2022-04-27

## 2022-04-27 ENCOUNTER — OFFICE VISIT (OUTPATIENT)
Dept: FAMILY MEDICINE CLINIC | Facility: CLINIC | Age: 32
End: 2022-04-27

## 2022-04-27 VITALS
BODY MASS INDEX: 30 KG/M2 | SYSTOLIC BLOOD PRESSURE: 108 MMHG | OXYGEN SATURATION: 99 % | DIASTOLIC BLOOD PRESSURE: 72 MMHG | HEIGHT: 62 IN | WEIGHT: 163 LBS | HEART RATE: 77 BPM

## 2022-04-27 DIAGNOSIS — E61.1 IRON DEFICIENCY: ICD-10-CM

## 2022-04-27 DIAGNOSIS — F33.0 MILD EPISODE OF RECURRENT MAJOR DEPRESSIVE DISORDER: ICD-10-CM

## 2022-04-27 DIAGNOSIS — Z00.00 ANNUAL PHYSICAL EXAM: Primary | ICD-10-CM

## 2022-04-27 DIAGNOSIS — E55.9 VITAMIN D DEFICIENCY: ICD-10-CM

## 2022-04-27 DIAGNOSIS — E53.8 VITAMIN B12 DEFICIENCY: ICD-10-CM

## 2022-04-27 PROCEDURE — 84466 ASSAY OF TRANSFERRIN: CPT

## 2022-04-27 PROCEDURE — 99395 PREV VISIT EST AGE 18-39: CPT | Performed by: FAMILY MEDICINE

## 2022-04-27 PROCEDURE — 82728 ASSAY OF FERRITIN: CPT

## 2022-04-27 PROCEDURE — 80053 COMPREHEN METABOLIC PANEL: CPT

## 2022-04-27 PROCEDURE — 83540 ASSAY OF IRON: CPT

## 2022-04-27 PROCEDURE — 36415 COLL VENOUS BLD VENIPUNCTURE: CPT

## 2022-04-27 PROCEDURE — 3008F BODY MASS INDEX DOCD: CPT | Performed by: FAMILY MEDICINE

## 2022-04-27 PROCEDURE — 82306 VITAMIN D 25 HYDROXY: CPT

## 2022-04-27 PROCEDURE — 85025 COMPLETE CBC W/AUTO DIFF WBC: CPT

## 2022-04-27 PROCEDURE — 82607 VITAMIN B-12: CPT

## 2022-04-27 RX ORDER — HYDROCODONE BITARTRATE AND ACETAMINOPHEN 5; 325 MG/1; MG/1
TABLET ORAL SEE ADMIN INSTRUCTIONS
COMMUNITY
Start: 2022-04-15 | End: 2022-04-27

## 2022-04-27 RX ORDER — NORETHINDRONE ACETATE AND ETHINYL ESTRADIOL AND FERROUS FUMARATE 1MG-20(21)
1 KIT ORAL DAILY
COMMUNITY
Start: 2022-03-03 | End: 2022-04-27

## 2022-04-28 LAB
25(OH)D3 SERPL-MCNC: 11.7 NG/ML (ref 30–100)
ALBUMIN SERPL-MCNC: 4.6 G/DL (ref 3.5–5.2)
ALBUMIN/GLOB SERPL: 1.4 G/DL
ALP SERPL-CCNC: 65 U/L (ref 39–117)
ALT SERPL W P-5'-P-CCNC: 11 U/L (ref 1–33)
ANION GAP SERPL CALCULATED.3IONS-SCNC: 12.2 MMOL/L (ref 5–15)
AST SERPL-CCNC: 14 U/L (ref 1–32)
BASOPHILS # BLD AUTO: 0.06 10*3/MM3 (ref 0–0.2)
BASOPHILS NFR BLD AUTO: 0.9 % (ref 0–1.5)
BILIRUB SERPL-MCNC: 0.3 MG/DL (ref 0–1.2)
BUN SERPL-MCNC: 11 MG/DL (ref 6–20)
BUN/CREAT SERPL: 14.1 (ref 7–25)
CALCIUM SPEC-SCNC: 9.2 MG/DL (ref 8.6–10.5)
CHLORIDE SERPL-SCNC: 105 MMOL/L (ref 98–107)
CO2 SERPL-SCNC: 22.8 MMOL/L (ref 22–29)
CREAT SERPL-MCNC: 0.78 MG/DL (ref 0.57–1)
DEPRECATED RDW RBC AUTO: 45.9 FL (ref 37–54)
EGFRCR SERPLBLD CKD-EPI 2021: 103.6 ML/MIN/1.73
EOSINOPHIL # BLD AUTO: 0.18 10*3/MM3 (ref 0–0.4)
EOSINOPHIL NFR BLD AUTO: 2.7 % (ref 0.3–6.2)
ERYTHROCYTE [DISTWIDTH] IN BLOOD BY AUTOMATED COUNT: 14.2 % (ref 12.3–15.4)
FERRITIN SERPL-MCNC: 19 NG/ML (ref 13–150)
GLOBULIN UR ELPH-MCNC: 3.2 GM/DL
GLUCOSE SERPL-MCNC: 77 MG/DL (ref 65–99)
HCT VFR BLD AUTO: 40.1 % (ref 34–46.6)
HGB BLD-MCNC: 13.1 G/DL (ref 12–15.9)
IMM GRANULOCYTES # BLD AUTO: 0.01 10*3/MM3 (ref 0–0.05)
IMM GRANULOCYTES NFR BLD AUTO: 0.2 % (ref 0–0.5)
IRON 24H UR-MRATE: 82 MCG/DL (ref 37–145)
IRON SATN MFR SERPL: 15 % (ref 20–50)
LYMPHOCYTES # BLD AUTO: 1.47 10*3/MM3 (ref 0.7–3.1)
LYMPHOCYTES NFR BLD AUTO: 22.3 % (ref 19.6–45.3)
MCH RBC QN AUTO: 28.5 PG (ref 26.6–33)
MCHC RBC AUTO-ENTMCNC: 32.7 G/DL (ref 31.5–35.7)
MCV RBC AUTO: 87.4 FL (ref 79–97)
MONOCYTES # BLD AUTO: 0.43 10*3/MM3 (ref 0.1–0.9)
MONOCYTES NFR BLD AUTO: 6.5 % (ref 5–12)
NEUTROPHILS NFR BLD AUTO: 4.45 10*3/MM3 (ref 1.7–7)
NEUTROPHILS NFR BLD AUTO: 67.4 % (ref 42.7–76)
NRBC BLD AUTO-RTO: 0 /100 WBC (ref 0–0.2)
PLATELET # BLD AUTO: 271 10*3/MM3 (ref 140–450)
PMV BLD AUTO: 11.1 FL (ref 6–12)
POTASSIUM SERPL-SCNC: 5.2 MMOL/L (ref 3.5–5.2)
PROT SERPL-MCNC: 7.8 G/DL (ref 6–8.5)
RBC # BLD AUTO: 4.59 10*6/MM3 (ref 3.77–5.28)
SODIUM SERPL-SCNC: 140 MMOL/L (ref 136–145)
TIBC SERPL-MCNC: 539 MCG/DL (ref 298–536)
TRANSFERRIN SERPL-MCNC: 362 MG/DL (ref 200–360)
VIT B12 BLD-MCNC: 244 PG/ML (ref 211–946)
WBC NRBC COR # BLD: 6.6 10*3/MM3 (ref 3.4–10.8)

## 2022-04-29 ENCOUNTER — TELEPHONE (OUTPATIENT)
Dept: FAMILY MEDICINE CLINIC | Facility: CLINIC | Age: 32
End: 2022-04-29

## 2022-05-04 ENCOUNTER — TELEPHONE (OUTPATIENT)
Dept: FAMILY MEDICINE CLINIC | Facility: CLINIC | Age: 32
End: 2022-05-04

## 2022-05-04 RX ORDER — LANOLIN ALCOHOL/MO/W.PET/CERES
1000 CREAM (GRAM) TOPICAL DAILY
Qty: 30 TABLET | Refills: 2 | OUTPATIENT
Start: 2022-05-04 | End: 2022-05-21

## 2022-05-04 NOTE — PROGRESS NOTES
Per Dr. Bucio, Ms. Cote has been called with recent lab results & recommendations.  Continue current medications and follow-up as planned or sooner if any problems.

## 2022-05-04 NOTE — TELEPHONE ENCOUNTER
Per Dr. Bucio, Ms. Cote has been called with recent lab results & recommendations.  Continue current medications and follow-up as planned or sooner if any problems.         ----- Message from Toya Bucio MD sent at 5/4/2022  1:18 AM CDT -----  Iron studies are low, but no anemia.  Needs supplement 3 times a week.  Vitamin B12 is low too, supplement sent.  Thanks, DANNY Bucio

## 2022-05-21 PROBLEM — F32.A DEPRESSION: Status: ACTIVE | Noted: 2022-01-24

## 2022-05-25 ENCOUNTER — OFFICE VISIT (OUTPATIENT)
Dept: OTOLARYNGOLOGY | Facility: CLINIC | Age: 32
End: 2022-05-25

## 2022-05-25 VITALS — OXYGEN SATURATION: 98 % | WEIGHT: 161.6 LBS | HEIGHT: 62 IN | BODY MASS INDEX: 29.74 KG/M2

## 2022-05-25 DIAGNOSIS — H66.015 RECURRENT ACUTE SUPPURATIVE OTITIS MEDIA WITH SPONTANEOUS RUPTURE OF LEFT TYMPANIC MEMBRANE: Primary | ICD-10-CM

## 2022-05-25 PROCEDURE — 99204 OFFICE O/P NEW MOD 45 MIN: CPT | Performed by: OTOLARYNGOLOGY

## 2022-05-25 RX ORDER — CIPROFLOXACIN HYDROCHLORIDE 3.5 MG/ML
SOLUTION/ DROPS TOPICAL
Qty: 10 ML | Refills: 0 | Status: SHIPPED | OUTPATIENT
Start: 2022-05-25 | End: 2022-06-15

## 2022-05-25 NOTE — PROGRESS NOTES
Subjective   Renetta Cote is a 31 y.o. female.       History of Present Illness     Patient has had problems with her ears for years and had multiple previous tube insertions in childhood.  Has not seen me since 2014.  Reports that 4 days ago she awoke with decreased hearing and otorrhea on the left.  Was seen at urgent care and diagnosed with an otitis media and placed on oral amoxicillin.  Reports that her hearing is decreased.  Pain is improved.  The following portions of the patient's history were reviewed and updated as appropriate: allergies, current medications, past family history, past medical history, past social history, past surgical history and problem list.     reports that she quit smoking about 4 years ago. Her smoking use included cigarettes. She started smoking about 5 years ago. She has a 0.25 pack-year smoking history. She has never used smokeless tobacco. She reports that she does not drink alcohol and does not use drugs.   Patient is not a tobacco user and has not been counseled for use of tobacco products      Review of Systems        Objective   Physical Exam  Right ear no discharge.  Tympanic membrane intact no infection or effusion  Left ear no discharge.  Tympanic membrane shows a crust on the drum.  Under the microscope this is removed revealing a perforation through which some cloudy gelatinous material is evacuated with suction.  She notes some improvement in hearing.  Ciprofloxacin is instilled.         Assessment and Plan   Diagnoses and all orders for this visit:    1. Recurrent acute suppurative otitis media with spontaneous rupture of left tympanic membrane (Primary)    Other orders  -     ciprofloxacin (Ciloxan) 0.3 % ophthalmic solution; 3 drops LEFT EAR twice a day for 7 days  Dispense: 10 mL; Refill: 0         Plan: Ear cleaned and crust removed as described above.  We will add topical ciprofloxacin for 7 days.  Continue the oral amoxicillin.  Recheck in 3 weeks.  Call  for problems.

## 2022-05-26 ENCOUNTER — PATIENT ROUNDING (BHMG ONLY) (OUTPATIENT)
Dept: OTOLARYNGOLOGY | Facility: CLINIC | Age: 32
End: 2022-05-26

## 2022-05-26 NOTE — PROGRESS NOTES
This is Keerthi with Dr. Najera office calling for Renetta Cote. I was calling to see how your visit with our office went yesterday.   Patient states everything went great  Do you have any recommendations on anything we can improve on? No   Did you have any trouble getting your ear drops from the pharmacy? No     Thank you if you have any questions between now and your follow up please give us a call.

## 2022-06-15 ENCOUNTER — OFFICE VISIT (OUTPATIENT)
Dept: OTOLARYNGOLOGY | Facility: CLINIC | Age: 32
End: 2022-06-15

## 2022-06-15 VITALS — OXYGEN SATURATION: 97 % | HEIGHT: 62 IN | BODY MASS INDEX: 30.44 KG/M2 | WEIGHT: 165.4 LBS | HEART RATE: 89 BPM

## 2022-06-15 DIAGNOSIS — H69.82 ETD (EUSTACHIAN TUBE DYSFUNCTION), LEFT: Primary | ICD-10-CM

## 2022-06-15 PROCEDURE — 99213 OFFICE O/P EST LOW 20 MIN: CPT | Performed by: OTOLARYNGOLOGY

## 2022-06-15 NOTE — PROGRESS NOTES
Subjective   Renetta Cote is a 31 y.o. female.       History of Present Illness   Patient had been seen previously with an acute suppurative otitis media with rupture on the left.  Has used topical ciprofloxacin and completed a course of oral amoxicillin and states her ear feels fine now.  Feels like her hearing is back to what she considers normal.  No otorrhea.      The following portions of the patient's history were reviewed and updated as appropriate: allergies, current medications, past family history, past medical history, past social history, past surgical history and problem list.     reports that she quit smoking about 4 years ago. Her smoking use included cigarettes. She started smoking about 5 years ago. She has a 0.25 pack-year smoking history. She has never used smokeless tobacco. She reports that she does not drink alcohol and does not use drugs.   Patient is not a tobacco user and has not been counseled for use of tobacco products      Review of Systems        Objective   Physical Exam  Ears: External ears no deformity.  Canals no discharge.  Right tympanic membrane is intact with no infection or effusion.  Left tympanic membrane is also intact with some tympanosclerosis but no evidence of infection or effusion       Assessment and Plan   Diagnoses and all orders for this visit:    1. ETD (Eustachian tube dysfunction), left (Primary)             Plan: Reassurance to the patient that her eardrum had completely healed and she had no residual infection or fluid and therefore no additional treatment is indicated at this point.  She may follow-up with me as needed for further problems with.

## 2023-08-05 PROCEDURE — 87635 SARS-COV-2 COVID-19 AMP PRB: CPT | Performed by: NURSE PRACTITIONER

## 2023-08-09 ENCOUNTER — OFFICE VISIT (OUTPATIENT)
Dept: FAMILY MEDICINE CLINIC | Facility: CLINIC | Age: 33
End: 2023-08-09
Payer: COMMERCIAL

## 2023-08-09 VITALS
HEART RATE: 102 BPM | TEMPERATURE: 98.2 F | SYSTOLIC BLOOD PRESSURE: 105 MMHG | OXYGEN SATURATION: 98 % | DIASTOLIC BLOOD PRESSURE: 71 MMHG

## 2023-08-09 DIAGNOSIS — U07.1 COVID-19: Primary | ICD-10-CM

## 2023-08-09 DIAGNOSIS — R05.1 ACUTE COUGH: ICD-10-CM

## 2023-08-09 LAB
EXPIRATION DATE: ABNORMAL
FLUAV AG UPPER RESP QL IA.RAPID: NOT DETECTED
FLUBV AG UPPER RESP QL IA.RAPID: NOT DETECTED
INTERNAL CONTROL: ABNORMAL
Lab: ABNORMAL
SARS-COV-2 AG UPPER RESP QL IA.RAPID: DETECTED

## 2023-08-09 PROCEDURE — 87428 SARSCOV & INF VIR A&B AG IA: CPT | Performed by: NURSE PRACTITIONER

## 2023-08-09 PROCEDURE — 1159F MED LIST DOCD IN RCRD: CPT | Performed by: NURSE PRACTITIONER

## 2023-08-09 PROCEDURE — 1160F RVW MEDS BY RX/DR IN RCRD: CPT | Performed by: NURSE PRACTITIONER

## 2023-08-09 PROCEDURE — 99213 OFFICE O/P EST LOW 20 MIN: CPT | Performed by: NURSE PRACTITIONER

## 2023-08-09 RX ORDER — BROMPHENIRAMINE MALEATE, PSEUDOEPHEDRINE HYDROCHLORIDE, AND DEXTROMETHORPHAN HYDROBROMIDE 2; 30; 10 MG/5ML; MG/5ML; MG/5ML
5 SYRUP ORAL 4 TIMES DAILY PRN
Qty: 240 ML | Refills: 0 | Status: SHIPPED | OUTPATIENT
Start: 2023-08-09

## 2023-08-09 NOTE — PROGRESS NOTES
Subjective   Renetta Cote is a 33 y.o. female. Fever     History of Present Illness   Patient presents today for fever. She started feeling bad on Friday. Symptoms include fever, chills, body aches, headache, cough, head congestion, back pain. She says she experienced pain in lower back but this symptom has subsided. Sudafed provided some symptom relief.    The following portions of the patient's history were reviewed and updated as appropriate: allergies, current medications, past family history, past medical history, past social history, past surgical history, and problem list.    Review of Systems   Constitutional:  Positive for chills and fever. Negative for fatigue.   HENT:  Positive for congestion. Negative for ear pain, rhinorrhea and sore throat.    Eyes:  Negative for blurred vision, double vision and visual disturbance.   Respiratory:  Negative for cough, chest tightness, shortness of breath and wheezing.    Cardiovascular:  Negative for chest pain, palpitations and leg swelling.   Gastrointestinal:  Negative for abdominal pain, diarrhea, nausea and vomiting.   Endocrine: Negative for cold intolerance and heat intolerance.   Genitourinary:  Negative for difficulty urinating, dysuria, frequency and hematuria.   Musculoskeletal:  Positive for back pain and myalgias. Negative for arthralgias, neck pain and neck stiffness.   Skin:  Negative for dry skin, pallor, rash, skin lesions and wound.   Allergic/Immunologic: Negative for environmental allergies, food allergies and immunocompromised state.   Neurological:  Positive for headache. Negative for dizziness, syncope, weakness, light-headedness and confusion.   Hematological:  Negative for adenopathy. Does not bruise/bleed easily.   Psychiatric/Behavioral:  Negative for self-injury, sleep disturbance, suicidal ideas, depressed mood and stress. The patient is not nervous/anxious.      Vitals:    08/09/23 1316   BP: 105/71   Pulse: 102   Temp: 98.2 øF  (36.8 øC)   SpO2: 98%     There is no height or weight on file to calculate BMI.    Objective   Physical Exam  Vitals and nursing note reviewed.   Constitutional:       General: She is not in acute distress.     Appearance: She is well-developed. She is not ill-appearing.   HENT:      Head: Normocephalic.      Right Ear: Hearing, tympanic membrane, ear canal and external ear normal.      Left Ear: Hearing, tympanic membrane, ear canal and external ear normal.      Nose: Nose normal.      Mouth/Throat:      Lips: Pink.      Mouth: Mucous membranes are moist.      Pharynx: Oropharynx is clear. Uvula midline. No oropharyngeal exudate.   Eyes:      General: Lids are normal. Gaze aligned appropriately.      Conjunctiva/sclera: Conjunctivae normal.      Pupils: Pupils are equal, round, and reactive to light.   Neck:      Thyroid: No thyroid mass, thyromegaly or thyroid tenderness.   Cardiovascular:      Rate and Rhythm: Normal rate and regular rhythm.      Heart sounds: Normal heart sounds, S1 normal and S2 normal. No murmur heard.  Pulmonary:      Effort: Pulmonary effort is normal.      Breath sounds: Normal breath sounds and air entry. No decreased breath sounds, wheezing, rhonchi or rales.   Abdominal:      General: Abdomen is flat. Bowel sounds are normal.      Palpations: Abdomen is soft.      Tenderness: There is no abdominal tenderness.   Musculoskeletal:         General: Normal range of motion.      Cervical back: Full passive range of motion without pain, normal range of motion and neck supple.   Lymphadenopathy:      Cervical: No cervical adenopathy.   Skin:     General: Skin is warm and dry.   Neurological:      General: No focal deficit present.      Mental Status: She is alert and oriented to person, place, and time.      Coordination: Coordination is intact.      Gait: Gait is intact.   Psychiatric:         Attention and Perception: Attention normal.         Mood and Affect: Mood normal.         Speech:  Speech normal.         Behavior: Behavior normal. Behavior is cooperative.         Thought Content: Thought content normal.         Cognition and Memory: Cognition normal.         Judgment: Judgment normal.       Assessment & Plan   Diagnoses and all orders for this visit:    1. COVID-19 (Primary)    2. Acute cough  -     POCT SARS-CoV-2 Antigen JUANIS + Flu  -     brompheniramine-pseudoephedrine-DM 30-2-10 MG/5ML syrup; Take 5 mL by mouth 4 (Four) Times a Day As Needed for Allergies.  Dispense: 240 mL; Refill: 0      SARS/FLU swab was positive for SARS.    Recommended rest, stay well hydrated.  Quarantine until 8/12/2023    Take Bromphed as directed PRN for cough/congestion.  Pt understands Bromphed contains sudafed so she should take either/or not both.    If symptoms do not improve or worsen, patient was instructed to return to clinic for further evaluation.         This document has been electronically signed by JEANNIE Clemons on  August 9, 2023 14:22 CDT

## 2024-11-18 NOTE — PROGRESS NOTES
Chief Complaint   Patient presents with   • High Risk Gestation     Renetta Cote is a 28 y.o. year old .      27 yo  c/w 10w sono   MELISA May 2, 2019 by LMP.  Dating ultrasound is within 4 days with MELISA  May 6, 2019.     - A+, RI, HIV neg, RPR NR, HBsAg neg, GC/CT neg/neg  - Discussed genetic and carrier screening; declined both at this time.       Having a girl named Yamileth Fernandes.     20 week ultrasound showed no abnormalities but was limited.     2019 at 23 weeks and 4 days ultrasound shows single intrauterine pregnancy in the vertex position with again some of the anatomic features limited by the fetal position.  Cervical length 4.51 cm.  Recommend repeat ultrasound in 3 weeks.     2019, maternal blood type A positive.  One hour Glucola and CBC in 3 weeks with follow-up ultrasound.     2019 single live intrauterine pregnancy at 26 weeks and 5 days.  Measurements are 1 week and 2 days behind stated gestational age.  Previously is suboptimal anatomy is visualized and appears normal with suboptimal views of the lumbar and sacral spine.  Normal amniotic fluid.  Normal cervical length.  Reassuring biophysical profile of 8 out of 8.  Umbilical and uterine artery Doppler changes consistent with an increased risk for IUGR, preeclampsia, stillbirth.  Recommend ultrasounds in 2 weeks for growth, biophysical profile, and Dopplers.     CBC and one-hour Glucola performed 2019.  One hour glucola 127.  Hg.4.  Hct 31.1.  Platelets 264,000.  She was placed on iron therapy.     2019 ultrasound shows single intrauterine pregnancy in the cephalic position fetal heart rate 150 bpm.  NURYS is normal at 13.16 cm.  Biophysical profile 6 out of 8.  Reactive NST making biophysical profile 8 out of 10.  Estimated fetal weight 2 pounds 7 ounces or 9.5 percentile.  Umbilical artery S/D ratio 2.9 and 2.6.     2019, ultrasound shows single intrauterine  pregnancy in the cephalic position with fetal heart rate 129 bpm.  NURYS is normal at 19.48 cm.  Biophysical profile 8 out of 8.  Estimated fetal weight 2 pounds 12 ounces or 10th percentile.  Continue weekly biophysical profiles with Dopplers.     2019 ultrasound shows single intrauterine pregnancy in the cephalic position with fetal heart rate 136 bpm.  NURYS 14.79 cm.  Biophysical profile 8 out of 8.  Biophysical profile and Dopplers and ultrasound for growth in 1 week.     2019, ultrasound shows single intrauterine pregnancy in the cephalic position with fetal heart rate 117 bpm.  NURYS normal at 13.51 cm.  Biophysical profile 8 out of 8.  Estimated fetal weight 1695 g or 3 pounds 12 ounces or 25th percentile.  Normal Dopplers.  Plan weekly BPP's.     2019, 33 weeks and 5 days, ultrasound shows single intrauterine pregnancy in the cephalic position with fetal heart rate 126 bpm.  NURYS normal at 15.49 cm.  Biophysical profile 8 out of 8.    2019, single intrauterine pregnancy in the cephalic position with fetal heart rate 158 bpm.  NURYS is 17.32 cm.  Biophysical profile 8 out of 8.  Estimated fetal weight 2285 g or 5 pounds 1 ounce or 27%.  GBS swab performed.    Obstetric History  #: 1, Date: 04/01/10, Sex: Female, Weight: 2551 g (5 lb 10 oz), GA: 38w0d, Delivery: Vaginal, Spontaneous, Apgar1: None, Apgar5: None, Living: Living, Birth Comments: jose f    #: 2, Date: None, Sex: None, Weight: None, GA: None, Delivery: None, Apgar1: None, Apgar5: None, Living: None, Birth Comments: None      The patient complains of the following: No new complaints    ROS  Headache: No   Visual changes: No   Swelling in legs: No   Nausea: No   Constipation: No   Diarrhea: No   Contractions: No   Leaking fluid: No   Vaginal bleeding: No   Other:      Specific topics discussed at today's visit: fetal kick counts and  labor precautions  Tests done today: BPP - 8 / 8 and growth ultrasound  GBS  Swab  Tests to be done at the next visit: SHELLEY Jackson was seen today for high risk gestation.    Diagnoses and all orders for this visit:    34 weeks gestation of pregnancy  -     Group B Strep (Molecular) - Swab, Vaginal/Rectum    Poor fetal growth  -     US Fetal Biophysical Profile;Without Non-Stress Testing; Standing    Obesity in pregnancy, antepartum, third trimester  -     US Fetal Biophysical Profile;Without Non-Stress Testing; Standing    Anemia during pregnancy in third trimester       Opt out